# Patient Record
Sex: MALE | Race: BLACK OR AFRICAN AMERICAN | NOT HISPANIC OR LATINO | URBAN - METROPOLITAN AREA
[De-identification: names, ages, dates, MRNs, and addresses within clinical notes are randomized per-mention and may not be internally consistent; named-entity substitution may affect disease eponyms.]

---

## 2020-07-24 ENCOUNTER — INPATIENT (INPATIENT)
Facility: HOSPITAL | Age: 75
LOS: 10 days | Discharge: HOME | End: 2020-08-04
Attending: INTERNAL MEDICINE | Admitting: INTERNAL MEDICINE
Payer: MEDICARE

## 2020-07-24 VITALS
WEIGHT: 259.93 LBS | HEART RATE: 96 BPM | DIASTOLIC BLOOD PRESSURE: 48 MMHG | TEMPERATURE: 98 F | SYSTOLIC BLOOD PRESSURE: 105 MMHG | RESPIRATION RATE: 22 BRPM | HEIGHT: 69 IN | OXYGEN SATURATION: 95 %

## 2020-07-24 PROCEDURE — 93010 ELECTROCARDIOGRAM REPORT: CPT

## 2020-07-24 PROCEDURE — 99285 EMERGENCY DEPT VISIT HI MDM: CPT

## 2020-07-24 NOTE — ED ADULT TRIAGE NOTE - CHIEF COMPLAINT QUOTE
BIBA- AMS, family states worse than normal. EMS found pt diaphoretic and Rapid Afib 160s. Cardizem 25mg given  HX dementia, Creole speaking  EMS , Left AC#18G BIBA- AMS, family states worse than normal. EMS found pt diaphoretic and Rapid Afib 160s. Cardizem 25mg given  HX dementia, Creole speaking, COVID in April  EMS , Left AC#18G

## 2020-07-25 DIAGNOSIS — R41.82 ALTERED MENTAL STATUS, UNSPECIFIED: ICD-10-CM

## 2020-07-25 DIAGNOSIS — I10 ESSENTIAL (PRIMARY) HYPERTENSION: ICD-10-CM

## 2020-07-25 DIAGNOSIS — I50.22 CHRONIC SYSTOLIC (CONGESTIVE) HEART FAILURE: ICD-10-CM

## 2020-07-25 DIAGNOSIS — E11.9 TYPE 2 DIABETES MELLITUS WITHOUT COMPLICATIONS: ICD-10-CM

## 2020-07-25 DIAGNOSIS — I63.9 CEREBRAL INFARCTION, UNSPECIFIED: ICD-10-CM

## 2020-07-25 DIAGNOSIS — I26.99 OTHER PULMONARY EMBOLISM WITHOUT ACUTE COR PULMONALE: ICD-10-CM

## 2020-07-25 DIAGNOSIS — F03.90 UNSPECIFIED DEMENTIA WITHOUT BEHAVIORAL DISTURBANCE: ICD-10-CM

## 2020-07-25 DIAGNOSIS — I48.91 UNSPECIFIED ATRIAL FIBRILLATION: ICD-10-CM

## 2020-07-25 DIAGNOSIS — G93.41 METABOLIC ENCEPHALOPATHY: ICD-10-CM

## 2020-07-25 LAB
ALBUMIN SERPL ELPH-MCNC: 2.4 G/DL — LOW (ref 3.3–5)
ALP SERPL-CCNC: 49 U/L — SIGNIFICANT CHANGE UP (ref 40–120)
ALT FLD-CCNC: 33 U/L — SIGNIFICANT CHANGE UP (ref 12–78)
ANION GAP SERPL CALC-SCNC: 6 MMOL/L — SIGNIFICANT CHANGE UP (ref 5–17)
APPEARANCE UR: CLEAR — SIGNIFICANT CHANGE UP
APTT BLD: 24.4 SEC — LOW (ref 27.5–35.5)
APTT BLD: 50.4 SEC — HIGH (ref 27.5–35.5)
APTT BLD: 73.4 SEC — HIGH (ref 27.5–35.5)
AST SERPL-CCNC: 36 U/L — SIGNIFICANT CHANGE UP (ref 15–37)
BACTERIA # UR AUTO: NEGATIVE — SIGNIFICANT CHANGE UP
BILIRUB SERPL-MCNC: 0.4 MG/DL — SIGNIFICANT CHANGE UP (ref 0.2–1.2)
BILIRUB UR-MCNC: NEGATIVE — SIGNIFICANT CHANGE UP
BUN SERPL-MCNC: 15 MG/DL — SIGNIFICANT CHANGE UP (ref 7–23)
CALCIUM SERPL-MCNC: 8.4 MG/DL — LOW (ref 8.5–10.1)
CHLORIDE SERPL-SCNC: 110 MMOL/L — HIGH (ref 96–108)
CO2 SERPL-SCNC: 26 MMOL/L — SIGNIFICANT CHANGE UP (ref 22–31)
COLOR SPEC: YELLOW — SIGNIFICANT CHANGE UP
CREAT SERPL-MCNC: 1.15 MG/DL — SIGNIFICANT CHANGE UP (ref 0.5–1.3)
D DIMER BLD IA.RAPID-MCNC: 955 NG/ML DDU — HIGH
DIFF PNL FLD: ABNORMAL
EPI CELLS # UR: SIGNIFICANT CHANGE UP
GLUCOSE BLDC GLUCOMTR-MCNC: 107 MG/DL — HIGH (ref 70–99)
GLUCOSE BLDC GLUCOMTR-MCNC: 129 MG/DL — HIGH (ref 70–99)
GLUCOSE BLDC GLUCOMTR-MCNC: 186 MG/DL — HIGH (ref 70–99)
GLUCOSE BLDC GLUCOMTR-MCNC: 203 MG/DL — HIGH (ref 70–99)
GLUCOSE BLDC GLUCOMTR-MCNC: 74 MG/DL — SIGNIFICANT CHANGE UP (ref 70–99)
GLUCOSE SERPL-MCNC: 215 MG/DL — HIGH (ref 70–99)
GLUCOSE UR QL: NEGATIVE MG/DL — SIGNIFICANT CHANGE UP
HCT VFR BLD CALC: 42.4 % — SIGNIFICANT CHANGE UP (ref 39–50)
HCT VFR BLD CALC: 43.3 % — SIGNIFICANT CHANGE UP (ref 39–50)
HGB BLD-MCNC: 13.4 G/DL — SIGNIFICANT CHANGE UP (ref 13–17)
HGB BLD-MCNC: 13.7 G/DL — SIGNIFICANT CHANGE UP (ref 13–17)
INR BLD: 1.3 RATIO — HIGH (ref 0.88–1.16)
KETONES UR-MCNC: NEGATIVE — SIGNIFICANT CHANGE UP
LACTATE SERPL-SCNC: 1.5 MMOL/L — SIGNIFICANT CHANGE UP (ref 0.7–2)
LEUKOCYTE ESTERASE UR-ACNC: NEGATIVE — SIGNIFICANT CHANGE UP
LIDOCAIN IGE QN: 98 U/L — SIGNIFICANT CHANGE UP (ref 73–393)
MCHC RBC-ENTMCNC: 27.8 PG — SIGNIFICANT CHANGE UP (ref 27–34)
MCHC RBC-ENTMCNC: 28 PG — SIGNIFICANT CHANGE UP (ref 27–34)
MCHC RBC-ENTMCNC: 31.6 GM/DL — LOW (ref 32–36)
MCHC RBC-ENTMCNC: 31.6 GM/DL — LOW (ref 32–36)
MCV RBC AUTO: 88 FL — SIGNIFICANT CHANGE UP (ref 80–100)
MCV RBC AUTO: 88.5 FL — SIGNIFICANT CHANGE UP (ref 80–100)
NITRITE UR-MCNC: NEGATIVE — SIGNIFICANT CHANGE UP
NRBC # BLD: 0 /100 WBCS — SIGNIFICANT CHANGE UP (ref 0–0)
NRBC # BLD: 0 /100 WBCS — SIGNIFICANT CHANGE UP (ref 0–0)
NT-PROBNP SERPL-SCNC: 969 PG/ML — HIGH (ref 0–125)
PH UR: 5 — SIGNIFICANT CHANGE UP (ref 5–8)
PLATELET # BLD AUTO: 171 K/UL — SIGNIFICANT CHANGE UP (ref 150–400)
PLATELET # BLD AUTO: 181 K/UL — SIGNIFICANT CHANGE UP (ref 150–400)
POTASSIUM SERPL-MCNC: 4 MMOL/L — SIGNIFICANT CHANGE UP (ref 3.5–5.3)
POTASSIUM SERPL-SCNC: 4 MMOL/L — SIGNIFICANT CHANGE UP (ref 3.5–5.3)
PROT SERPL-MCNC: 6 GM/DL — SIGNIFICANT CHANGE UP (ref 6–8.3)
PROT UR-MCNC: 15 MG/DL
PROTHROM AB SERPL-ACNC: 14.3 SEC — HIGH (ref 10.6–13.6)
RBC # BLD: 4.82 M/UL — SIGNIFICANT CHANGE UP (ref 4.2–5.8)
RBC # BLD: 4.89 M/UL — SIGNIFICANT CHANGE UP (ref 4.2–5.8)
RBC # FLD: 15.8 % — HIGH (ref 10.3–14.5)
RBC # FLD: 16 % — HIGH (ref 10.3–14.5)
RBC CASTS # UR COMP ASSIST: ABNORMAL /HPF (ref 0–4)
SARS-COV-2 RNA SPEC QL NAA+PROBE: SIGNIFICANT CHANGE UP
SODIUM SERPL-SCNC: 142 MMOL/L — SIGNIFICANT CHANGE UP (ref 135–145)
SP GR SPEC: 1.01 — SIGNIFICANT CHANGE UP (ref 1.01–1.02)
TROPONIN I SERPL-MCNC: 0.03 NG/ML — SIGNIFICANT CHANGE UP (ref 0.01–0.04)
TSH SERPL-MCNC: 0.69 UIU/ML — SIGNIFICANT CHANGE UP (ref 0.36–3.74)
UROBILINOGEN FLD QL: NEGATIVE MG/DL — SIGNIFICANT CHANGE UP
WBC # BLD: 4.62 K/UL — SIGNIFICANT CHANGE UP (ref 3.8–10.5)
WBC # BLD: 4.78 K/UL — SIGNIFICANT CHANGE UP (ref 3.8–10.5)
WBC # FLD AUTO: 4.62 K/UL — SIGNIFICANT CHANGE UP (ref 3.8–10.5)
WBC # FLD AUTO: 4.78 K/UL — SIGNIFICANT CHANGE UP (ref 3.8–10.5)
WBC UR QL: SIGNIFICANT CHANGE UP

## 2020-07-25 PROCEDURE — 71045 X-RAY EXAM CHEST 1 VIEW: CPT | Mod: 26

## 2020-07-25 PROCEDURE — 71275 CT ANGIOGRAPHY CHEST: CPT | Mod: 26

## 2020-07-25 PROCEDURE — 70450 CT HEAD/BRAIN W/O DYE: CPT | Mod: 26

## 2020-07-25 PROCEDURE — 99222 1ST HOSP IP/OBS MODERATE 55: CPT

## 2020-07-25 PROCEDURE — 74177 CT ABD & PELVIS W/CONTRAST: CPT | Mod: 26

## 2020-07-25 RX ORDER — HEPARIN SODIUM 5000 [USP'U]/ML
2300 INJECTION INTRAVENOUS; SUBCUTANEOUS
Qty: 25000 | Refills: 0 | Status: DISCONTINUED | OUTPATIENT
Start: 2020-07-25 | End: 2020-07-28

## 2020-07-25 RX ORDER — ENOXAPARIN SODIUM 100 MG/ML
120 INJECTION SUBCUTANEOUS ONCE
Refills: 0 | Status: DISCONTINUED | OUTPATIENT
Start: 2020-07-25 | End: 2020-07-25

## 2020-07-25 RX ORDER — DEXTROSE 50 % IN WATER 50 %
25 SYRINGE (ML) INTRAVENOUS ONCE
Refills: 0 | Status: DISCONTINUED | OUTPATIENT
Start: 2020-07-25 | End: 2020-08-04

## 2020-07-25 RX ORDER — ASPIRIN/CALCIUM CARB/MAGNESIUM 324 MG
162 TABLET ORAL DAILY
Refills: 0 | Status: DISCONTINUED | OUTPATIENT
Start: 2020-07-25 | End: 2020-08-04

## 2020-07-25 RX ORDER — SPIRONOLACTONE 25 MG/1
25 TABLET, FILM COATED ORAL DAILY
Refills: 0 | Status: DISCONTINUED | OUTPATIENT
Start: 2020-07-25 | End: 2020-08-04

## 2020-07-25 RX ORDER — MEMANTINE HYDROCHLORIDE 10 MG/1
5 TABLET ORAL DAILY
Refills: 0 | Status: DISCONTINUED | OUTPATIENT
Start: 2020-07-25 | End: 2020-08-04

## 2020-07-25 RX ORDER — HEPARIN SODIUM 5000 [USP'U]/ML
9500 INJECTION INTRAVENOUS; SUBCUTANEOUS EVERY 6 HOURS
Refills: 0 | Status: DISCONTINUED | OUTPATIENT
Start: 2020-07-25 | End: 2020-07-25

## 2020-07-25 RX ORDER — HEPARIN SODIUM 5000 [USP'U]/ML
4500 INJECTION INTRAVENOUS; SUBCUTANEOUS EVERY 6 HOURS
Refills: 0 | Status: DISCONTINUED | OUTPATIENT
Start: 2020-07-25 | End: 2020-07-25

## 2020-07-25 RX ORDER — QUETIAPINE FUMARATE 200 MG/1
25 TABLET, FILM COATED ORAL AT BEDTIME
Refills: 0 | Status: DISCONTINUED | OUTPATIENT
Start: 2020-07-25 | End: 2020-07-27

## 2020-07-25 RX ORDER — HEPARIN SODIUM 5000 [USP'U]/ML
4500 INJECTION INTRAVENOUS; SUBCUTANEOUS EVERY 6 HOURS
Refills: 0 | Status: DISCONTINUED | OUTPATIENT
Start: 2020-07-25 | End: 2020-07-28

## 2020-07-25 RX ORDER — HEPARIN SODIUM 5000 [USP'U]/ML
9000 INJECTION INTRAVENOUS; SUBCUTANEOUS EVERY 6 HOURS
Refills: 0 | Status: DISCONTINUED | OUTPATIENT
Start: 2020-07-25 | End: 2020-07-28

## 2020-07-25 RX ORDER — DEXTROSE 50 % IN WATER 50 %
15 SYRINGE (ML) INTRAVENOUS ONCE
Refills: 0 | Status: DISCONTINUED | OUTPATIENT
Start: 2020-07-25 | End: 2020-08-04

## 2020-07-25 RX ORDER — SODIUM CHLORIDE 9 MG/ML
1000 INJECTION, SOLUTION INTRAVENOUS
Refills: 0 | Status: DISCONTINUED | OUTPATIENT
Start: 2020-07-25 | End: 2020-08-04

## 2020-07-25 RX ORDER — INSULIN LISPRO 100/ML
VIAL (ML) SUBCUTANEOUS
Refills: 0 | Status: DISCONTINUED | OUTPATIENT
Start: 2020-07-25 | End: 2020-08-04

## 2020-07-25 RX ORDER — HEPARIN SODIUM 5000 [USP'U]/ML
INJECTION INTRAVENOUS; SUBCUTANEOUS
Qty: 25000 | Refills: 0 | Status: DISCONTINUED | OUTPATIENT
Start: 2020-07-25 | End: 2020-07-25

## 2020-07-25 RX ORDER — ISOSORBIDE MONONITRATE 60 MG/1
30 TABLET, EXTENDED RELEASE ORAL DAILY
Refills: 0 | Status: DISCONTINUED | OUTPATIENT
Start: 2020-07-25 | End: 2020-07-27

## 2020-07-25 RX ORDER — GLUCAGON INJECTION, SOLUTION 0.5 MG/.1ML
1 INJECTION, SOLUTION SUBCUTANEOUS ONCE
Refills: 0 | Status: DISCONTINUED | OUTPATIENT
Start: 2020-07-25 | End: 2020-08-04

## 2020-07-25 RX ORDER — CARVEDILOL PHOSPHATE 80 MG/1
6.25 CAPSULE, EXTENDED RELEASE ORAL EVERY 12 HOURS
Refills: 0 | Status: DISCONTINUED | OUTPATIENT
Start: 2020-07-25 | End: 2020-07-26

## 2020-07-25 RX ORDER — DEXTROSE 50 % IN WATER 50 %
12.5 SYRINGE (ML) INTRAVENOUS ONCE
Refills: 0 | Status: DISCONTINUED | OUTPATIENT
Start: 2020-07-25 | End: 2020-08-04

## 2020-07-25 RX ADMIN — HEPARIN SODIUM 2100 UNIT(S)/HR: 5000 INJECTION INTRAVENOUS; SUBCUTANEOUS at 10:47

## 2020-07-25 RX ADMIN — MEMANTINE HYDROCHLORIDE 5 MILLIGRAM(S): 10 TABLET ORAL at 11:51

## 2020-07-25 RX ADMIN — HEPARIN SODIUM 2300 UNIT(S)/HR: 5000 INJECTION INTRAVENOUS; SUBCUTANEOUS at 18:52

## 2020-07-25 RX ADMIN — Medication 162 MILLIGRAM(S): at 03:12

## 2020-07-25 RX ADMIN — CARVEDILOL PHOSPHATE 6.25 MILLIGRAM(S): 80 CAPSULE, EXTENDED RELEASE ORAL at 17:42

## 2020-07-25 RX ADMIN — ISOSORBIDE MONONITRATE 30 MILLIGRAM(S): 60 TABLET, EXTENDED RELEASE ORAL at 11:51

## 2020-07-25 RX ADMIN — HEPARIN SODIUM 2100 UNIT(S)/HR: 5000 INJECTION INTRAVENOUS; SUBCUTANEOUS at 03:34

## 2020-07-25 RX ADMIN — Medication 2: at 16:51

## 2020-07-25 RX ADMIN — QUETIAPINE FUMARATE 25 MILLIGRAM(S): 200 TABLET, FILM COATED ORAL at 21:24

## 2020-07-25 NOTE — ED PROVIDER NOTE - OBJECTIVE STATEMENT
76 y/o M with pmx 76 y/o M with pmhx pacemaker, does not know his other medical history (medication list includes seroquel, lantus, plavix, isosorbide, carvedilol, namenda, spironolactone) wit AMS x 1 day. per EMS and family patient was walking around naked today and confused. they state patient always has trouble breathing. had covid in april.

## 2020-07-25 NOTE — H&P ADULT - NSHPLABSRESULTS_GEN_ALL_CORE
Recent Vitals  T(C): 37.3 (07-25-20 @ 01:23), Max: 37.3 (07-25-20 @ 01:23)  HR: 90 (07-25-20 @ 03:35) (85 - 96)  BP: 115/58 (07-25-20 @ 03:35) (105/48 - 117/52)  RR: 18 (07-25-20 @ 03:35) (17 - 22)  SpO2: 95% (07-25-20 @ 03:35) (94% - 95%)                        13.7   4.78  )-----------( 171      ( 25 Jul 2020 00:10 )             43.3     07-25    142  |  110<H>  |  15  ----------------------------<  215<H>  4.0   |  26  |  1.15    Ca    8.4<L>      25 Jul 2020 00:10    TPro  6.0  /  Alb  2.4<L>  /  TBili  0.4  /  DBili  x   /  AST  36  /  ALT  33  /  AlkPhos  49  07-25    PT/INR - ( 25 Jul 2020 00:10 )   PT: 14.3 sec;   INR: 1.30 ratio         PTT - ( 25 Jul 2020 00:10 )  PTT:24.4 sec  LIVER FUNCTIONS - ( 25 Jul 2020 00:10 )  Alb: 2.4 g/dL / Pro: 6.0 gm/dL / ALK PHOS: 49 U/L / ALT: 33 U/L / AST: 36 U/L / GGT: x               Home Medications:

## 2020-07-25 NOTE — H&P ADULT - HISTORY OF PRESENT ILLNESS
Patient is a 75F with a suspected PMH of CHF s/p PPM, HTN, DM, dementia who was brought to the ED per AMS.  Patient primarily speaks [place] Creole.  Patient currently AAOx1, unable to provide history.  Per ED attending, patient was noted to not be at his baseline.  Reportedly patient was agitated, confused, walking around his house naked.  Reportedly had COVID19 in April.  When EMS arrived, he was found to be in Afib with .  Given diltiazem in the field.  Patient's apartment reportedly extremely hot and patient found extremely diaphoretic.  Vitals stable in ED.  Labs benign.  Preliminary CT findings - L sided pulmonary embolism and thrombus within the L atrium.  Will admit to tele.

## 2020-07-25 NOTE — PROGRESS NOTE ADULT - PROBLEM SELECTOR PLAN 1
my colleague wanted rule out infection   f/u blood cultures,    UA relatively unremarkable   Has hx of dementia, reportedly current mental status worse than baseline

## 2020-07-25 NOTE — PROGRESS NOTE ADULT - SUBJECTIVE AND OBJECTIVE BOX
Patient is a 75y old  Male who presents with a chief complaint of AMS, Afib (2020 03:36)      OVERNIGHT EVENTS:      REVIEW OF SYSTEMS: denies chest pain/SOB, diaphoresis, no F/C, cough, dizziness, headache, blurry vision, nausea, vomiting, abdominal pain. Rest unremarkable     MEDICATIONS  (STANDING):  aspirin  chewable 162 milliGRAM(s) Oral daily  carvedilol 6.25 milliGRAM(s) Oral every 12 hours  dextrose 5%. 1000 milliLiter(s) (50 mL/Hr) IV Continuous <Continuous>  dextrose 50% Injectable 12.5 Gram(s) IV Push once  dextrose 50% Injectable 25 Gram(s) IV Push once  dextrose 50% Injectable 25 Gram(s) IV Push once  heparin  Infusion.  Unit(s)/Hr (21 mL/Hr) IV Continuous <Continuous>  insulin lispro (HumaLOG) corrective regimen sliding scale   SubCutaneous three times a day before meals  isosorbide   mononitrate ER Tablet (IMDUR) 30 milliGRAM(s) Oral daily  memantine 5 milliGRAM(s) Oral daily  QUEtiapine 25 milliGRAM(s) Oral at bedtime  spironolactone 25 milliGRAM(s) Oral daily    MEDICATIONS  (PRN):  dextrose 40% Gel 15 Gram(s) Oral once PRN Blood Glucose LESS THAN 70 milliGRAM(s)/deciliter  glucagon  Injectable 1 milliGRAM(s) IntraMuscular once PRN Glucose LESS THAN 70 milligrams/deciliter  heparin   Injectable 9500 Unit(s) IV Push every 6 hours PRN For aPTT less than 40  heparin   Injectable 4500 Unit(s) IV Push every 6 hours PRN For aPTT between 40 - 57      Allergies    No Known Allergies    Intolerances        SUBJECTIVE: in bed in NAD, no acute events overnight     T(F): 98.8 (20 @ 06:06), Max: 99.1 (20 @ 01:23)  HR: 93 (20 @ 06:06) (85 - 96)  BP: 131/70 (20 @ 06:06) (105/48 - 131/70)  RR: 20 (20 @ 06:06) (17 - 22)  SpO2: 95% (20 @ 06:06) (94% - 96%)  Wt(kg): --    PHYSICAL EXAM:  GENERAL: NAD, well-groomed, well-developed  HEAD:  Atraumatic, Normocephalic  EYES: EOMI, PERRLA, conjunctiva and sclera clear  ENMT: No tonsillar erythema, exudates, or enlargement; Moist mucous membranes, Good dentition, No lesions  NECK: Supple, No JVD, Normal thyroid  CHEST/LUNG: Clear to  auscultation bilaterally; No rales, rhonchi, wheezing, or rubs  bilaterally  HEART: iregular rate and rhythm; No murmurs, rubs, or gallops  ABDOMEN: Soft, Nontender, Nondistended; Bowel sounds present  EXTREMITIES:  2+ Peripheral Pulses, No clubbing, cyanosis, or edema BL LE  SKIN: No rashes or lesions  NERVOUS SYSTEM:  Alert, not oriented   DTRs 2+ intact and symmetric, sensation intact BL    LABS:                        13.7   4.78  )-----------( 171      ( 2020 00:10 )             43.3     07-25    142  |  110<H>  |  15  ----------------------------<  215<H>  4.0   |  26  |  1.15    Ca    8.4<L>      2020 00:10    TPro  6.0  /  Alb  2.4<L>  /  TBili  0.4  /  DBili  x   /  AST  36  /  ALT  33  /  AlkPhos  49  07-25    PT/INR - ( 2020 00:10 )   PT: 14.3 sec;   INR: 1.30 ratio         PTT - ( 2020 00:10 )  PTT:24.4 sec  Urinalysis Basic - ( 2020 04:10 )    Color: Yellow / Appearance: Clear / S.010 / pH: x  Gluc: x / Ketone: Negative  / Bili: Negative / Urobili: Negative mg/dL   Blood: x / Protein: 15 mg/dL / Nitrite: Negative   Leuk Esterase: Negative / RBC: 3-5 /HPF / WBC 0-2   Sq Epi: x / Non Sq Epi: Occasional / Bacteria: Negative      Cultures;   CAPILLARY BLOOD GLUCOSE    POCT Blood Glucose.: 107 mg/dL (2020 08:00)  POCT Blood Glucose.: 129 mg/dL (2020 05:58)    Lipid panel:     CARDIAC MARKERS ( 2020 00:10 )  .031 ng/mL / x     / x     / x     / x            RADIOLOGY & ADDITIONAL TESTS:  < from: Xray Chest 1 View- PORTABLE-Routine (20 @ 06:44) >  IMPRESSION: Mild increased pulmonary vascular congestion noted. No focal consolidation is seen. Status post valve replacement.        < from: CT Abdomen and Pelvis w/ IV Cont (20 @ 04:58) >  IMPRESSION:    1.  No acute CT abnormality in the abdomen and pelvis. Please refer to the separately dictated CT chest report for critical findings.    < end of copied text >    < from: CT Head No Cont (20 @ 04:58) >    No evidence of acute intracranial hemorrhage or midline shift.    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 There is no other CT evidence of acute territorial infarct. If the patient's symptoms persist, consider short interval follow-up head CT or brain MRI if there are no MRI contraindications.            < end of copied text >      Imaging Personally Reviewed:  [ x] YES      Consultant(s) Notes Reviewed:  [x ] YES     Care Discussed with [x ] Consultants [X ] Patient [x ] Family  [x ]    [x ]  Other; RN Patient is a 75y old  Male who presents with a chief complaint of AMS, Afib (2020 03:36)      OVERNIGHT EVENTS:      REVIEW OF SYSTEMS: denies chest pain/SOB, diaphoresis, no F/C, cough, dizziness, headache, blurry vision, nausea, vomiting, abdominal pain. Rest unremarkable     MEDICATIONS  (STANDING):  aspirin  chewable 162 milliGRAM(s) Oral daily  carvedilol 6.25 milliGRAM(s) Oral every 12 hours  dextrose 5%. 1000 milliLiter(s) (50 mL/Hr) IV Continuous <Continuous>  dextrose 50% Injectable 12.5 Gram(s) IV Push once  dextrose 50% Injectable 25 Gram(s) IV Push once  dextrose 50% Injectable 25 Gram(s) IV Push once  heparin  Infusion.  Unit(s)/Hr (21 mL/Hr) IV Continuous <Continuous>  insulin lispro (HumaLOG) corrective regimen sliding scale   SubCutaneous three times a day before meals  isosorbide   mononitrate ER Tablet (IMDUR) 30 milliGRAM(s) Oral daily  memantine 5 milliGRAM(s) Oral daily  QUEtiapine 25 milliGRAM(s) Oral at bedtime  spironolactone 25 milliGRAM(s) Oral daily    MEDICATIONS  (PRN):  dextrose 40% Gel 15 Gram(s) Oral once PRN Blood Glucose LESS THAN 70 milliGRAM(s)/deciliter  glucagon  Injectable 1 milliGRAM(s) IntraMuscular once PRN Glucose LESS THAN 70 milligrams/deciliter  heparin   Injectable 9500 Unit(s) IV Push every 6 hours PRN For aPTT less than 40  heparin   Injectable 4500 Unit(s) IV Push every 6 hours PRN For aPTT between 40 - 57      Allergies    No Known Allergies    Intolerances        SUBJECTIVE: in bed in NAD, no acute events overnight     T(F): 98.8 (20 @ 06:06), Max: 99.1 (20 @ 01:23)  HR: 93 (20 @ 06:06) (85 - 96)  BP: 131/70 (20 @ 06:06) (105/48 - 131/70)  RR: 20 (20 @ 06:06) (17 - 22)  SpO2: 95% (20 @ 06:06) (94% - 96%)  Wt(kg): --    PHYSICAL EXAM:  GENERAL: NAD, well-groomed, well-developed  HEAD:  Atraumatic, Normocephalic  EYES: EOMI, PERRLA, conjunctiva and sclera clear  ENMT: No tonsillar erythema, exudates, or enlargement; Moist mucous membranes, Good dentition, No lesions  NECK: Supple, No JVD, Normal thyroid  CHEST/LUNG: Clear to  auscultation bilaterally; No rales, rhonchi, wheezing, or rubs  bilaterally  HEART: iregular rate and rhythm; No murmurs, rubs, or gallops  ABDOMEN: Soft, Nontender, Nondistended; Bowel sounds present  EXTREMITIES:  2+ Peripheral Pulses, No clubbing, cyanosis, or edema BL LE  SKIN: No rashes or lesions  NERVOUS SYSTEM:  Alert, and oriented to person and place not time    DTRs 2+ intact and symmetric, sensation intact BL    LABS:                        13.7   4.78  )-----------( 171      ( 2020 00:10 )             43.3     07-25    142  |  110<H>  |  15  ----------------------------<  215<H>  4.0   |  26  |  1.15    Ca    8.4<L>      2020 00:10    TPro  6.0  /  Alb  2.4<L>  /  TBili  0.4  /  DBili  x   /  AST  36  /  ALT  33  /  AlkPhos  49  07-25    PT/INR - ( 2020 00:10 )   PT: 14.3 sec;   INR: 1.30 ratio         PTT - ( 2020 00:10 )  PTT:24.4 sec  Urinalysis Basic - ( 2020 04:10 )    Color: Yellow / Appearance: Clear / S.010 / pH: x  Gluc: x / Ketone: Negative  / Bili: Negative / Urobili: Negative mg/dL   Blood: x / Protein: 15 mg/dL / Nitrite: Negative   Leuk Esterase: Negative / RBC: 3-5 /HPF / WBC 0-2   Sq Epi: x / Non Sq Epi: Occasional / Bacteria: Negative      Cultures;   CAPILLARY BLOOD GLUCOSE    POCT Blood Glucose.: 107 mg/dL (2020 08:00)  POCT Blood Glucose.: 129 mg/dL (2020 05:58)    Lipid panel:     CARDIAC MARKERS ( 2020 00:10 )  .031 ng/mL / x     / x     / x     / x            RADIOLOGY & ADDITIONAL TESTS:  < from: Xray Chest 1 View- PORTABLE-Routine (20 @ 06:44) >  IMPRESSION: Mild increased pulmonary vascular congestion noted. No focal consolidation is seen. Status post valve replacement.        < from: CT Abdomen and Pelvis w/ IV Cont (20 @ 04:58) >  IMPRESSION:    1.  No acute CT abnormality in the abdomen and pelvis. Please refer to the separately dictated CT chest report for critical findings.    < end of copied text >    < from: CT Head No Cont (20 @ 04:58) >    No evidence of acute intracranial hemorrhage or midline shift.    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 There is no other CT evidence of acute territorial infarct. If the patient's symptoms persist, consider short interval follow-up head CT or brain MRI if there are no MRI contraindications.            < end of copied text >      Imaging Personally Reviewed:  [ x] YES      Consultant(s) Notes Reviewed:  [x ] YES     Care Discussed with [x ] Consultants [X ] Patient [x ] Family  [x ]    [x ]  Other; RN

## 2020-07-25 NOTE — PROGRESS NOTE ADULT - PROBLEM SELECTOR PLAN 3
was Started on heparin drip by my colleague    f/u echo    also noted JUAN thrombus on CT chest  will get Cardiology consult may need AGUSTÍN if TTE unrevealing will defer to cardiology AC as per above, currently rate controlled.  Continue carvedilol  Noted thrombus in LA - delpirore consulted    f/u echo

## 2020-07-25 NOTE — CONSULT NOTE ADULT - SUBJECTIVE AND OBJECTIVE BOX
HPI:  HPI:  Patient is a 75F with a suspected PMH of CHF s/p PPM, HTN, DM, dementia who was brought to the ED per AMS.  Patient primarily speaks [place] Creole.  Patient currently AAOx1, unable to provide history.  Per ED attending, patient was noted to not be at his baseline.  Reportedly patient was agitated, confused, walking around his house naked.  Reportedly had COVID19 in April.  When EMS arrived, he was found to be in Afib with .  Given diltiazem in the field.  Patient's apartment reportedly extremely hot and patient found extremely diaphoretic.  Vitals stable in ED.  Labs benign.  Preliminary CT findings - L sided pulmonary embolism and thrombus within the L atrium.  Will admit to tele. (2020 03:36)      Chief Complaint:  Patient is a 75y old  Male who presents with a chief complaint of AMS, Afib (2020 09:51)      Review of Systems:    General:  No wt loss, fevers, chills, night sweats  Eyes:  Good vision, no reported pain  ENT:  No sore throat, pain, runny nose, dysphagia  CV:  No pain, palpitations, hypo/hypertension  Resp:  No dyspnea, cough, tachypnea, wheezing  GI:  No pain, nausea, vomiting, diarrhea, constipation  :  No pain, bleeding, incontinence, nocturia           Social History/Family History  SOCHX:   tobacco,  -  alcohol    FMHX: FA/MO  - contributory       Discussed with:  PMD, Family    Physical Exam:    Vital Signs:  Vital Signs Last 24 Hrs  T(C): 36.7 (2020 16:48), Max: 37.3 (2020 01:23)  T(F): 98.1 (2020 16:48), Max: 99.1 (2020 01:23)  HR: 96 (2020 16:48) (85 - 96)  BP: 115/70 (2020 16:48) (105/48 - 131/70)  BP(mean): --  RR: 18 (2020 16:48) (17 - 22)  SpO2: 99% (2020 16:48) (94% - 99%)  Daily Height in cm: 167.64 (2020 16:48)    Daily   I&O's Summary    2020 07:01  -  2020 07:00  --------------------------------------------------------  IN: 0 mL / OUT: 650 mL / NET: -650 mL  2020 07:01  -  2020 21:36  --------------------------------------------------------  IN: 720 mL / OUT: 0 mL / NET: 720 mL          Chest:  Full & symmetric excursion, no increased effort, breath sounds clear  Cardiovascular:  Regular rhythm, S1, S2, no murmur/rub/S3/S4, no carotid/femoral/abdominal bruit, radial/pedal pulses 2+,   Abdomen:  Soft, non-tender, non-distended, normoactive bowel sounds, no HSM      Laboratory:                          13.4   4.62  )-----------( 181      ( 2020 10:32 )             42.4     07-25    142  |  110<H>  |  15  ----------------------------<  215<H>  4.0   |  26  |  1.15    Ca    8.4<L>      2020 00:10    TPro  6.0  /  Alb  2.4<L>  /  TBili  0.4  /  DBili  x   /  AST  36  /  ALT  33  /  AlkPhos  49  07-25      CARDIAC MARKERS ( 2020 00:10 )  .031 ng/mL / x     / x     / x     / x          CAPILLARY BLOOD GLUCOSE      POCT Blood Glucose.: 186 mg/dL (2020 21:23)  POCT Blood Glucose.: 203 mg/dL (2020 16:45)  POCT Blood Glucose.: 74 mg/dL (2020 11:10)  POCT Blood Glucose.: 107 mg/dL (2020 08:00)  POCT Blood Glucose.: 129 mg/dL (2020 05:58)    LIVER FUNCTIONS - ( 2020 00:10 )  Alb: 2.4 g/dL / Pro: 6.0 gm/dL / ALK PHOS: 49 U/L / ALT: 33 U/L / AST: 36 U/L / GGT: x           PT/INR - ( 2020 00:10 )   PT: 14.3 sec;   INR: 1.30 ratio         PTT - ( 2020 17:39 )  PTT:50.4 sec  Urinalysis Basic - ( 2020 04:10 )    Color: Yellow / Appearance: Clear / S.010 / pH: x  Gluc: x / Ketone: Negative  / Bili: Negative / Urobili: Negative mg/dL   Blood: x / Protein: 15 mg/dL / Nitrite: Negative   Leuk Esterase: Negative / RBC: 3-5 /HPF / WBC 0-2   Sq Epi: x / Non Sq Epi: Occasional / Bacteria: Negative      Assessment:  the patient's primary diagnosis was altered mental status  Upon review of the chart due to the patient not giving an adequate history through an  they appear confused  the CAT scan angiogram reveals a pulmonary embolus and a possible extension into the atrium of a thrombus  Treatment for both of these issues remains the same with heparin intravenous continuing  A diagnostic echocardiogram is completed however the official reading is not complete as yet  Cardiac enzymes remain normal, BNP level is low  EKG is noted And vital signs remained stable on current regimen

## 2020-07-25 NOTE — ED ADULT NURSE NOTE - CHIEF COMPLAINT QUOTE
BIBA- AMS, family states worse than normal. EMS found pt diaphoretic and Rapid Afib 160s. Cardizem 25mg given  HX dementia, Creole speaking  EMS , Left AC#18G

## 2020-07-25 NOTE — PROGRESS NOTE ADULT - PROBLEM SELECTOR PROBLEM 3
Acute pulmonary embolism, unspecified pulmonary embolism type, unspecified whether acute cor pulmonale present Atrial fibrillation, unspecified type

## 2020-07-25 NOTE — H&P ADULT - NSHPPHYSICALEXAM_GEN_ALL_CORE
Physical exam:  General: morbidly obese patient in no acute distress   Head:  Atraumatic, Normocephalic  Eyes: EOMI, PERRLA, clear sclera  Neck: Supple, thyroid nontender, non enlarged  Cardio: S1/S2 +ve, irregular rhythm,   Resp: clear to ausculation bilaterally, no rales or wheezes  GI: abdomen soft, nontender, non distended, no guarding, BS +ve x 4  Ext: +1 edema bilaterally  Neuro: AAOx1, no gross motor deficits, unresponsive to commands   Skin: No rashes or lesions

## 2020-07-25 NOTE — PROGRESS NOTE ADULT - PROBLEM SELECTOR PROBLEM 2
Altered mental status, unspecified altered mental status type Acute pulmonary embolism, unspecified pulmonary embolism type, unspecified whether acute cor pulmonale present

## 2020-07-25 NOTE — H&P ADULT - PROBLEM SELECTOR PLAN 1
Will r/o infection.  Sending blood cultures, will straight cath for urine specimen  Has hx of dementia, reportedly current mental status worse than baseline

## 2020-07-25 NOTE — ED PROVIDER NOTE - CARE PLAN
Principal Discharge DX:	Pulmonary embolus  Secondary Diagnosis:	Altered mental state  Secondary Diagnosis:	Abnormal EKG

## 2020-07-25 NOTE — H&P ADULT - ASSESSMENT
Patient is a 75F with a suspected PMH of CHF s/p PPM, HTN, DM, dementia who was brought to the ED per AMS.  Patient primarily speaks [place] Creole.  Patient currently AAOx1, unable to provide history.  Per ED attending, patient was noted to not be at his baseline.  Reportedly patient was agitated, confused, walking around his house naked.  Reportedly had COVID19 in April.  When EMS arrived, he was found to be in Afib with .  Given diltiazem in the field.  Patient's apartment reportedly extremely hot and patient found extremely diaphoretic.  Vitals stable in ED.  Labs benign.  Preliminary CT findings - L sided pulmonary embolism and thrombus within the L atrium.  Will admit to tele.    IMPROVE VTE Individual Risk Assessment          RISK                                                          Points  [  ] Previous VTE                                                3  [  ] Thrombophilia                                             2  [  ] Lower limb paralysis                                    2        (unable to hold up >15 seconds)    [  ] Current Cancer                                             2         (within 6 months)  [  ] Immobilization > 24 hrs                              1  [  ] ICU/CCU stay > 24 hours                            1  [  ] Age > 60                                                    1    IMPROVE VTE Score - 1

## 2020-07-25 NOTE — H&P ADULT - PROBLEM SELECTOR PLAN 3
AC as per above, currently rate controlled.  Continue carvedilol  Noted thrombus in LA - will order TTE, cardio eval in am

## 2020-07-25 NOTE — PROGRESS NOTE ADULT - PROBLEM SELECTOR PROBLEM 6
Chronic systolic congestive heart failure Dementia without behavioral disturbance, unspecified dementia type

## 2020-07-25 NOTE — PROGRESS NOTE ADULT - PROBLEM SELECTOR PLAN 5
age indeterminant infarct in left BG   unclear given presence of Mitral valve repair and sternotomy can obtain MRI .    nevertheless check lipid panel    continue preventive measures and treatment for cva    get pt/ot spironolactone, imdur

## 2020-07-25 NOTE — ED PROVIDER NOTE - PROGRESS NOTE DETAILS
CT read significant for PE and atrial thrombus. lovenox and aspirin ordered for anticoagulation and for TWI on ekg. will admit for PE, inpatient workup including cardiology evaluation and echocardiogram, and managememnt of altere dmental state. BNP elevated, will not siruese as patient not clinically fluid overloaded, and possible that BNP elevation was secondary to ipaired ventricular filling due to RVR.

## 2020-07-26 LAB
A1C WITH ESTIMATED AVERAGE GLUCOSE RESULT: 12.3 % — HIGH (ref 4–5.6)
ALBUMIN SERPL ELPH-MCNC: 2.2 G/DL — LOW (ref 3.3–5)
ALP SERPL-CCNC: 43 U/L — SIGNIFICANT CHANGE UP (ref 40–120)
ALT FLD-CCNC: 33 U/L — SIGNIFICANT CHANGE UP (ref 12–78)
ANION GAP SERPL CALC-SCNC: 6 MMOL/L — SIGNIFICANT CHANGE UP (ref 5–17)
APTT BLD: 146 SEC — CRITICAL HIGH (ref 27.5–35.5)
APTT BLD: 161.4 SEC — CRITICAL HIGH (ref 27.5–35.5)
APTT BLD: 93.7 SEC — HIGH (ref 27.5–35.5)
AST SERPL-CCNC: 37 U/L — SIGNIFICANT CHANGE UP (ref 15–37)
BILIRUB SERPL-MCNC: 0.4 MG/DL — SIGNIFICANT CHANGE UP (ref 0.2–1.2)
BUN SERPL-MCNC: 11 MG/DL — SIGNIFICANT CHANGE UP (ref 7–23)
CALCIUM SERPL-MCNC: 8.2 MG/DL — LOW (ref 8.5–10.1)
CHLORIDE SERPL-SCNC: 107 MMOL/L — SIGNIFICANT CHANGE UP (ref 96–108)
CHOLEST SERPL-MCNC: 212 MG/DL — HIGH (ref 10–199)
CO2 SERPL-SCNC: 26 MMOL/L — SIGNIFICANT CHANGE UP (ref 22–31)
CREAT SERPL-MCNC: 0.87 MG/DL — SIGNIFICANT CHANGE UP (ref 0.5–1.3)
ESTIMATED AVERAGE GLUCOSE: 306 MG/DL — HIGH (ref 68–114)
GLUCOSE BLDC GLUCOMTR-MCNC: 126 MG/DL — HIGH (ref 70–99)
GLUCOSE BLDC GLUCOMTR-MCNC: 161 MG/DL — HIGH (ref 70–99)
GLUCOSE BLDC GLUCOMTR-MCNC: 176 MG/DL — HIGH (ref 70–99)
GLUCOSE BLDC GLUCOMTR-MCNC: 213 MG/DL — HIGH (ref 70–99)
GLUCOSE SERPL-MCNC: 131 MG/DL — HIGH (ref 70–99)
HCT VFR BLD CALC: 39.1 % — SIGNIFICANT CHANGE UP (ref 39–50)
HCT VFR BLD CALC: 42.3 % — SIGNIFICANT CHANGE UP (ref 39–50)
HCV AB S/CO SERPL IA: 0.12 S/CO — SIGNIFICANT CHANGE UP (ref 0–0.99)
HCV AB SERPL-IMP: SIGNIFICANT CHANGE UP
HDLC SERPL-MCNC: 30 MG/DL — LOW
HGB BLD-MCNC: 12.4 G/DL — LOW (ref 13–17)
HGB BLD-MCNC: 12.7 G/DL — LOW (ref 13–17)
LIPID PNL WITH DIRECT LDL SERPL: 158 MG/DL — HIGH
MAGNESIUM SERPL-MCNC: 2.1 MG/DL — SIGNIFICANT CHANGE UP (ref 1.6–2.6)
MCHC RBC-ENTMCNC: 27.4 PG — SIGNIFICANT CHANGE UP (ref 27–34)
MCHC RBC-ENTMCNC: 28.3 PG — SIGNIFICANT CHANGE UP (ref 27–34)
MCHC RBC-ENTMCNC: 30 GM/DL — LOW (ref 32–36)
MCHC RBC-ENTMCNC: 31.7 GM/DL — LOW (ref 32–36)
MCV RBC AUTO: 89.3 FL — SIGNIFICANT CHANGE UP (ref 80–100)
MCV RBC AUTO: 91.2 FL — SIGNIFICANT CHANGE UP (ref 80–100)
NRBC # BLD: 0 /100 WBCS — SIGNIFICANT CHANGE UP (ref 0–0)
NRBC # BLD: 0 /100 WBCS — SIGNIFICANT CHANGE UP (ref 0–0)
PHOSPHATE SERPL-MCNC: 4 MG/DL — SIGNIFICANT CHANGE UP (ref 2.5–4.5)
PLATELET # BLD AUTO: 141 K/UL — LOW (ref 150–400)
PLATELET # BLD AUTO: 178 K/UL — SIGNIFICANT CHANGE UP (ref 150–400)
POTASSIUM SERPL-MCNC: 3.6 MMOL/L — SIGNIFICANT CHANGE UP (ref 3.5–5.3)
POTASSIUM SERPL-SCNC: 3.6 MMOL/L — SIGNIFICANT CHANGE UP (ref 3.5–5.3)
PROT SERPL-MCNC: 5.7 GM/DL — LOW (ref 6–8.3)
RBC # BLD: 4.38 M/UL — SIGNIFICANT CHANGE UP (ref 4.2–5.8)
RBC # BLD: 4.64 M/UL — SIGNIFICANT CHANGE UP (ref 4.2–5.8)
RBC # FLD: 15.9 % — HIGH (ref 10.3–14.5)
RBC # FLD: 16 % — HIGH (ref 10.3–14.5)
SARS-COV-2 IGG SERPL QL IA: POSITIVE
SARS-COV-2 IGM SERPL IA-ACNC: 2.98 INDEX — HIGH
SODIUM SERPL-SCNC: 139 MMOL/L — SIGNIFICANT CHANGE UP (ref 135–145)
TOTAL CHOLESTEROL/HDL RATIO MEASUREMENT: 7.1 RATIO — SIGNIFICANT CHANGE UP (ref 3.4–9.6)
TRIGL SERPL-MCNC: 120 MG/DL — SIGNIFICANT CHANGE UP (ref 10–149)
WBC # BLD: 3.25 K/UL — LOW (ref 3.8–10.5)
WBC # BLD: 4.02 K/UL — SIGNIFICANT CHANGE UP (ref 3.8–10.5)
WBC # FLD AUTO: 3.25 K/UL — LOW (ref 3.8–10.5)
WBC # FLD AUTO: 4.02 K/UL — SIGNIFICANT CHANGE UP (ref 3.8–10.5)

## 2020-07-26 PROCEDURE — 99233 SBSQ HOSP IP/OBS HIGH 50: CPT

## 2020-07-26 PROCEDURE — 93010 ELECTROCARDIOGRAM REPORT: CPT

## 2020-07-26 RX ORDER — CARVEDILOL PHOSPHATE 80 MG/1
12.5 CAPSULE, EXTENDED RELEASE ORAL EVERY 12 HOURS
Refills: 0 | Status: DISCONTINUED | OUTPATIENT
Start: 2020-07-26 | End: 2020-08-04

## 2020-07-26 RX ORDER — ATORVASTATIN CALCIUM 80 MG/1
40 TABLET, FILM COATED ORAL AT BEDTIME
Refills: 0 | Status: DISCONTINUED | OUTPATIENT
Start: 2020-07-26 | End: 2020-08-04

## 2020-07-26 RX ADMIN — QUETIAPINE FUMARATE 25 MILLIGRAM(S): 200 TABLET, FILM COATED ORAL at 22:53

## 2020-07-26 RX ADMIN — MEMANTINE HYDROCHLORIDE 5 MILLIGRAM(S): 10 TABLET ORAL at 14:03

## 2020-07-26 RX ADMIN — ISOSORBIDE MONONITRATE 30 MILLIGRAM(S): 60 TABLET, EXTENDED RELEASE ORAL at 14:03

## 2020-07-26 RX ADMIN — CARVEDILOL PHOSPHATE 12.5 MILLIGRAM(S): 80 CAPSULE, EXTENDED RELEASE ORAL at 17:30

## 2020-07-26 RX ADMIN — HEPARIN SODIUM 1500 UNIT(S)/HR: 5000 INJECTION INTRAVENOUS; SUBCUTANEOUS at 21:58

## 2020-07-26 RX ADMIN — Medication 2: at 17:31

## 2020-07-26 RX ADMIN — HEPARIN SODIUM 0 UNIT(S)/HR: 5000 INJECTION INTRAVENOUS; SUBCUTANEOUS at 11:35

## 2020-07-26 RX ADMIN — Medication 1: at 11:34

## 2020-07-26 RX ADMIN — HEPARIN SODIUM 1500 UNIT(S)/HR: 5000 INJECTION INTRAVENOUS; SUBCUTANEOUS at 12:40

## 2020-07-26 RX ADMIN — Medication 162 MILLIGRAM(S): at 14:03

## 2020-07-26 RX ADMIN — ATORVASTATIN CALCIUM 40 MILLIGRAM(S): 80 TABLET, FILM COATED ORAL at 21:53

## 2020-07-26 RX ADMIN — HEPARIN SODIUM 1900 UNIT(S)/HR: 5000 INJECTION INTRAVENOUS; SUBCUTANEOUS at 02:54

## 2020-07-26 RX ADMIN — CARVEDILOL PHOSPHATE 6.25 MILLIGRAM(S): 80 CAPSULE, EXTENDED RELEASE ORAL at 05:23

## 2020-07-26 RX ADMIN — SPIRONOLACTONE 25 MILLIGRAM(S): 25 TABLET, FILM COATED ORAL at 05:23

## 2020-07-26 RX ADMIN — HEPARIN SODIUM 0 UNIT(S)/HR: 5000 INJECTION INTRAVENOUS; SUBCUTANEOUS at 01:50

## 2020-07-26 NOTE — PHYSICAL THERAPY INITIAL EVALUATION ADULT - LIVES WITH, PROFILE
Pt states he lives in pvt house with 3 family members, 3 steps to enter with rail, no steps inside the house.

## 2020-07-26 NOTE — PROGRESS NOTE ADULT - PROBLEM SELECTOR PLAN 4
age indeterminant infarct in left BG   unclear given presence of Mitral valve repair and sternotomy can obtain MRI .    nevertheless check lipid panel    continue preventive measures and treatment for cva    get pt/ot

## 2020-07-26 NOTE — PHYSICAL THERAPY INITIAL EVALUATION ADULT - DIAGNOSIS, PT EVAL
Pt presented with ability to perform tasks-bed mobility, transfers and ambulation with supervision and set up, functional endurance is fair +, does not need walking device.

## 2020-07-26 NOTE — PROGRESS NOTE ADULT - SUBJECTIVE AND OBJECTIVE BOX
Patient is a 75y old  Male who presents with a chief complaint of AMS, Afib (25 Jul 2020 03:36)      OVERNIGHT EVENTS: none    MEDICATIONS  (STANDING):  aspirin  chewable 162 milliGRAM(s) Oral daily  carvedilol 6.25 milliGRAM(s) Oral every 12 hours  dextrose 5%. 1000 milliLiter(s) (50 mL/Hr) IV Continuous <Continuous>  dextrose 50% Injectable 12.5 Gram(s) IV Push once  dextrose 50% Injectable 25 Gram(s) IV Push once  dextrose 50% Injectable 25 Gram(s) IV Push once  heparin  Infusion. 2300 Unit(s)/Hr (23 mL/Hr) IV Continuous <Continuous>  insulin lispro (HumaLOG) corrective regimen sliding scale   SubCutaneous three times a day before meals  isosorbide   mononitrate ER Tablet (IMDUR) 30 milliGRAM(s) Oral daily  memantine 5 milliGRAM(s) Oral daily  QUEtiapine 25 milliGRAM(s) Oral at bedtime  spironolactone 25 milliGRAM(s) Oral daily    MEDICATIONS  (PRN):  dextrose 40% Gel 15 Gram(s) Oral once PRN Blood Glucose LESS THAN 70 milliGRAM(s)/deciliter  glucagon  Injectable 1 milliGRAM(s) IntraMuscular once PRN Glucose LESS THAN 70 milligrams/deciliter  heparin   Injectable 9000 Unit(s) IV Push every 6 hours PRN For aPTT less than 40  heparin   Injectable 4500 Unit(s) IV Push every 6 hours PRN For aPTT between 40 - 57    Allergies    No Known Allergies    Intolerances        SUBJECTIVE: in bed in NAD, no acute events overnight     Vital Signs Last 24 Hrs  T(C): 36.7 (26 Jul 2020 05:00), Max: 37.1 (26 Jul 2020 00:18)  T(F): 98 (26 Jul 2020 05:00), Max: 98.8 (26 Jul 2020 00:18)  HR: 101 (26 Jul 2020 05:21) (91 - 104)  BP: 135/77 (26 Jul 2020 05:21) (115/70 - 135/77)  BP(mean): --  RR: 18 (26 Jul 2020 05:00) (18 - 19)  SpO2: 98% (26 Jul 2020 05:00) (96% - 99%)    PHYSICAL EXAM:  GENERAL: NAD, well-groomed, well-developed  HEAD:  Atraumatic, Normocephalic  EYES: EOMI, PERRLA, conjunctiva and sclera clear  ENMT: No tonsillar erythema, exudates, or enlargement; Moist mucous membranes, Good dentition, No lesions  NECK: Supple, No JVD, Normal thyroid  CHEST/LUNG: Clear to  auscultation bilaterally; No rales, rhonchi, wheezing, or rubs  bilaterally  HEART: iregular rate and rhythm; No murmurs, rubs, or gallops  ABDOMEN: Soft, Nontender, Nondistended; Bowel sounds present  EXTREMITIES:  2+ Peripheral Pulses, No clubbing, cyanosis, or edema BL LE  SKIN: No rashes or lesions  NERVOUS SYSTEM:  Alert, and oriented to person and place not time    DTRs 2+ intact and symmetric, sensation intact BL    LABS:                                          12.7   3.25  )-----------( 178      ( 26 Jul 2020 07:47 )             42.3   07-26    139  |  107  |  11  ----------------------------<  131<H>  3.6   |  26  |  0.87    Ca    8.2<L>      26 Jul 2020 07:47  Phos  4.0     07-26  Mg     2.1     07-26    TPro  5.7<L>  /  Alb  2.2<L>  /  TBili  0.4  /  DBili  x   /  AST  37  /  ALT  33  /  AlkPhos  43  07-26           Cultures;   CAPILLARY BLOOD GLUCOSE      POCT Blood Glucose.: 126 mg/dL (26 Jul 2020 07:47)  POCT Blood Glucose.: 186 mg/dL (25 Jul 2020 21:23)  POCT Blood Glucose.: 203 mg/dL (25 Jul 2020 16:45)  POCT Blood Glucose.: 74 mg/dL (25 Jul 2020 11:10)    Lipid panel:     CARDIAC MARKERS ( 25 Jul 2020 00:10 )  .031 ng/mL / x     / x     / x     / x            RADIOLOGY & ADDITIONAL TESTS:  < from: Xray Chest 1 View- PORTABLE-Routine (07.25.20 @ 06:44) >  IMPRESSION: Mild increased pulmonary vascular congestion noted. No focal consolidation is seen. Status post valve replacement.        < from: CT Abdomen and Pelvis w/ IV Cont (07.25.20 @ 04:58) >  IMPRESSION:    1.  No acute CT abnormality in the abdomen and pelvis. Please refer to the separately dictated CT chest report for critical findings.    < end of copied text >    < from: CT Head No Cont (07.25.20 @ 04:58) >    No evidence of acute intracranial hemorrhage or midline shift.    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 There is no other CT evidence of acute territorial infarct. If the patient's symptoms persist, consider short interval follow-up head CT or brain MRI if there are no MRI contraindications.            < end of copied text >      Imaging Personally Reviewed:  [ x] YES      Consultant(s) Notes Reviewed:  [x ] YES     Care Discussed with [x ] Consultants [X ] Patient [x ] Family  [x ]    [x ]  Other; RN

## 2020-07-26 NOTE — PHYSICAL THERAPY INITIAL EVALUATION ADULT - PRECAUTIONS/LIMITATIONS, REHAB EVAL
pt found with lap belt and chair alarm, CNA states pt was confused earlier and wanted to get up and walk by himself without asking for assistance.

## 2020-07-26 NOTE — PROGRESS NOTE ADULT - PROBLEM SELECTOR PLAN 3
AC as per above, currently rate controlled.  Continue carvedilol  Noted thrombus in LA - delpirore consult noted    f/u echo

## 2020-07-26 NOTE — PROGRESS NOTE ADULT - SUBJECTIVE AND OBJECTIVE BOX
75F with a suspected PMH of CHF s/p PPM, HTN, DM, dementia who was brought to the ED per AMS.  Patient primarily speaks [place] Creole.  Patient currently AAOx1, unable to provide history.  Per ED attending, patient was noted to not be at his baseline.  Reportedly patient was agitated, confused, walking around his house naked.  Reportedly had COVID19 in April.  When EMS arrived, he was found to be in Afib with .  Given diltiazem in the field.  Patient's apartment reportedly extremely hot and patient found extremely diaphoretic.  Vitals stable in ED.  Labs benign.  Preliminary CT findings - L sided pulmonary embolism and thrombus within the L atrium.  Will admit to tele. (2020 03:36)      Chief Complaint:  Patient is a 75y old  Male who presents with a chief complaint of AMS, Afib (2020 09:51)      Review of Systems:    General:  No wt loss, fevers, chills, night sweats  Eyes:  Good vision, no reported pain  ENT:  No sore throat, pain, runny nose, dysphagia  CV:  No pain, palpitations, hypo/hypertension  Resp:  No dyspnea, cough, tachypnea, wheezing  GI:  No pain, nausea, vomiting, diarrhea, constipation  :  No pain, bleeding, incontinence, nocturia           Social History/Family History  SOCHX:   tobacco,  -  alcohol    FMHX: FA/MO  - contributory       Discussed with:  PMD, Family    Physical Exam:    Vital Signs:  Vital Signs Last 24 Hrs  T(C): 36.7 (2020 16:48), Max: 37.3 (2020 01:23)  T(F): 98.1 (2020 16:48), Max: 99.1 (2020 01:23)  HR: 96 (2020 16:48) (85 - 96)  BP: 115/70 (2020 16:48) (105/48 - 131/70)  BP(mean): --  RR: 18 (2020 16:48) (17 - 22)  SpO2: 99% (2020 16:48) (94% - 99%)  Daily Height in cm: 167.64 (2020 16:48)    Daily   I&O's Summary    2020 07:01  -  2020 07:00  --------------------------------------------------------  IN: 0 mL / OUT: 650 mL / NET: -650 mL  2020 07:01  -  2020 21:36  --------------------------------------------------------  IN: 720 mL / OUT: 0 mL / NET: 720 mL          Chest:  Full & symmetric excursion, no increased effort, breath sounds clear  Cardiovascular:  Regular rhythm, S1, S2, no murmur/rub/S3/S4, no carotid/femoral/abdominal bruit, radial/pedal pulses 2+,   Abdomen:  Soft, non-tender, non-distended, normoactive bowel sounds, no HSM      Laboratory:                          13.4   4.62  )-----------( 181      ( 2020 10:32 )             42.4     07-25    142  |  110<H>  |  15  ----------------------------<  215<H>  4.0   |  26  |  1.15    Ca    8.4<L>      2020 00:10    TPro  6.0  /  Alb  2.4<L>  /  TBili  0.4  /  DBili  x   /  AST  36  /  ALT  33  /  AlkPhos  49  07-25      CARDIAC MARKERS ( 2020 00:10 )  .031 ng/mL / x     / x     / x     / x          CAPILLARY BLOOD GLUCOSE      POCT Blood Glucose.: 186 mg/dL (2020 21:23)  POCT Blood Glucose.: 203 mg/dL (2020 16:45)  POCT Blood Glucose.: 74 mg/dL (2020 11:10)  POCT Blood Glucose.: 107 mg/dL (2020 08:00)  POCT Blood Glucose.: 129 mg/dL (2020 05:58)    LIVER FUNCTIONS - ( 2020 00:10 )  Alb: 2.4 g/dL / Pro: 6.0 gm/dL / ALK PHOS: 49 U/L / ALT: 33 U/L / AST: 36 U/L / GGT: x           PT/INR - ( 2020 00:10 )   PT: 14.3 sec;   INR: 1.30 ratio         PTT - ( 2020 17:39 )  PTT:50.4 sec  Urinalysis Basic - ( 2020 04:10 )    Color: Yellow / Appearance: Clear / S.010 / pH: x  Gluc: x / Ketone: Negative  / Bili: Negative / Urobili: Negative mg/dL   Blood: x / Protein: 15 mg/dL / Nitrite: Negative   Leuk Esterase: Negative / RBC: 3-5 /HPF / WBC 0-2   Sq Epi: x / Non Sq Epi: Occasional / Bacteria: Negative      Assessment:  the patient's primary diagnosis was altered mental status  Upon review of the chart due to the patient not giving an adequate history through an  they appear confused  the CAT scan angiogram reveals a pulmonary embolus and a possible extension into the atrium of a thrombus  Treatment for both of these issues remains the same with heparin intravenous continuing  A diagnostic echocardiogram is not completed  Cardiac enzymes remain normal, BNP level is low  EKG is noted And vital signs remained stable on current regimen

## 2020-07-26 NOTE — PHYSICAL THERAPY INITIAL EVALUATION ADULT - GENERAL OBSERVATIONS, REHAB EVAL
Pt found alert, oriented x 3, follow simple directions, cooperative, not in distress, no complaints, on room air, has IV line, speaks both Creole and English.

## 2020-07-27 LAB
APTT BLD: 71.6 SEC — HIGH (ref 27.5–35.5)
GLUCOSE BLDC GLUCOMTR-MCNC: 152 MG/DL — HIGH (ref 70–99)
GLUCOSE BLDC GLUCOMTR-MCNC: 182 MG/DL — HIGH (ref 70–99)
GLUCOSE BLDC GLUCOMTR-MCNC: 202 MG/DL — HIGH (ref 70–99)
GLUCOSE BLDC GLUCOMTR-MCNC: 278 MG/DL — HIGH (ref 70–99)
HCT VFR BLD CALC: 38.8 % — LOW (ref 39–50)
HCT VFR BLD CALC: 42.2 % — SIGNIFICANT CHANGE UP (ref 39–50)
HGB BLD-MCNC: 12.1 G/DL — LOW (ref 13–17)
HGB BLD-MCNC: 12.9 G/DL — LOW (ref 13–17)
MCHC RBC-ENTMCNC: 27.7 PG — SIGNIFICANT CHANGE UP (ref 27–34)
MCHC RBC-ENTMCNC: 27.9 PG — SIGNIFICANT CHANGE UP (ref 27–34)
MCHC RBC-ENTMCNC: 30.6 GM/DL — LOW (ref 32–36)
MCHC RBC-ENTMCNC: 31.2 GM/DL — LOW (ref 32–36)
MCV RBC AUTO: 89.4 FL — SIGNIFICANT CHANGE UP (ref 80–100)
MCV RBC AUTO: 90.6 FL — SIGNIFICANT CHANGE UP (ref 80–100)
NRBC # BLD: 0 /100 WBCS — SIGNIFICANT CHANGE UP (ref 0–0)
NRBC # BLD: 0 /100 WBCS — SIGNIFICANT CHANGE UP (ref 0–0)
PLATELET # BLD AUTO: 174 K/UL — SIGNIFICANT CHANGE UP (ref 150–400)
PLATELET # BLD AUTO: 177 K/UL — SIGNIFICANT CHANGE UP (ref 150–400)
RBC # BLD: 4.34 M/UL — SIGNIFICANT CHANGE UP (ref 4.2–5.8)
RBC # BLD: 4.66 M/UL — SIGNIFICANT CHANGE UP (ref 4.2–5.8)
RBC # FLD: 15.9 % — HIGH (ref 10.3–14.5)
RBC # FLD: 16 % — HIGH (ref 10.3–14.5)
WBC # BLD: 4.4 K/UL — SIGNIFICANT CHANGE UP (ref 3.8–10.5)
WBC # BLD: 5.06 K/UL — SIGNIFICANT CHANGE UP (ref 3.8–10.5)
WBC # FLD AUTO: 4.4 K/UL — SIGNIFICANT CHANGE UP (ref 3.8–10.5)
WBC # FLD AUTO: 5.06 K/UL — SIGNIFICANT CHANGE UP (ref 3.8–10.5)

## 2020-07-27 PROCEDURE — 99233 SBSQ HOSP IP/OBS HIGH 50: CPT

## 2020-07-27 PROCEDURE — 93306 TTE W/DOPPLER COMPLETE: CPT | Mod: 26

## 2020-07-27 RX ORDER — INSULIN GLARGINE 100 [IU]/ML
30 INJECTION, SOLUTION SUBCUTANEOUS AT BEDTIME
Refills: 0 | Status: DISCONTINUED | OUTPATIENT
Start: 2020-07-27 | End: 2020-08-04

## 2020-07-27 RX ORDER — QUETIAPINE FUMARATE 200 MG/1
200 TABLET, FILM COATED ORAL AT BEDTIME
Refills: 0 | Status: DISCONTINUED | OUTPATIENT
Start: 2020-07-27 | End: 2020-08-04

## 2020-07-27 RX ORDER — ISOSORBIDE MONONITRATE 60 MG/1
60 TABLET, EXTENDED RELEASE ORAL DAILY
Refills: 0 | Status: DISCONTINUED | OUTPATIENT
Start: 2020-07-28 | End: 2020-08-04

## 2020-07-27 RX ORDER — ACETAMINOPHEN 500 MG
650 TABLET ORAL ONCE
Refills: 0 | Status: COMPLETED | OUTPATIENT
Start: 2020-07-27 | End: 2020-07-27

## 2020-07-27 RX ADMIN — CARVEDILOL PHOSPHATE 12.5 MILLIGRAM(S): 80 CAPSULE, EXTENDED RELEASE ORAL at 05:51

## 2020-07-27 RX ADMIN — ISOSORBIDE MONONITRATE 30 MILLIGRAM(S): 60 TABLET, EXTENDED RELEASE ORAL at 14:00

## 2020-07-27 RX ADMIN — Medication 1: at 16:21

## 2020-07-27 RX ADMIN — SPIRONOLACTONE 25 MILLIGRAM(S): 25 TABLET, FILM COATED ORAL at 05:51

## 2020-07-27 RX ADMIN — Medication 1 MILLIGRAM(S): at 13:42

## 2020-07-27 RX ADMIN — QUETIAPINE FUMARATE 200 MILLIGRAM(S): 200 TABLET, FILM COATED ORAL at 21:43

## 2020-07-27 RX ADMIN — MEMANTINE HYDROCHLORIDE 5 MILLIGRAM(S): 10 TABLET ORAL at 14:00

## 2020-07-27 RX ADMIN — Medication 3: at 11:51

## 2020-07-27 RX ADMIN — Medication 2: at 08:03

## 2020-07-27 RX ADMIN — INSULIN GLARGINE 30 UNIT(S): 100 INJECTION, SOLUTION SUBCUTANEOUS at 21:44

## 2020-07-27 RX ADMIN — HEPARIN SODIUM 1500 UNIT(S)/HR: 5000 INJECTION INTRAVENOUS; SUBCUTANEOUS at 05:53

## 2020-07-27 RX ADMIN — Medication 162 MILLIGRAM(S): at 14:00

## 2020-07-27 RX ADMIN — Medication 650 MILLIGRAM(S): at 04:59

## 2020-07-27 RX ADMIN — ATORVASTATIN CALCIUM 40 MILLIGRAM(S): 80 TABLET, FILM COATED ORAL at 21:43

## 2020-07-27 RX ADMIN — Medication 650 MILLIGRAM(S): at 05:44

## 2020-07-27 RX ADMIN — CARVEDILOL PHOSPHATE 12.5 MILLIGRAM(S): 80 CAPSULE, EXTENDED RELEASE ORAL at 18:27

## 2020-07-27 NOTE — PROGRESS NOTE ADULT - PROBLEM SELECTOR PLAN 3
AC as per above, currently rate controlled.  Continue carvedilol  Noted thrombus in LA - delpirore consult noted    f/u echo    7/27/2020 spoke to daughter and based off meds pt may have had afib etiology and chronicity unknown , but takes digoxin. currently rate controlled

## 2020-07-27 NOTE — DIETITIAN INITIAL EVALUATION ADULT. - ENERGY NEEDS
Ht (cm): 167.6   Wt (kg): 116 kg ( 07-)     IBW:   64.4   %IBW:  180%   UBW:  unknown    %UBW: unknown

## 2020-07-27 NOTE — DIETITIAN INITIAL EVALUATION ADULT. - PERTINENT MEDS FT
MEDICATIONS  (STANDING):  aspirin  chewable 162 milliGRAM(s) Oral daily  atorvastatin 40 milliGRAM(s) Oral at bedtime  carvedilol 12.5 milliGRAM(s) Oral every 12 hours  dextrose 5%. 1000 milliLiter(s) (50 mL/Hr) IV Continuous <Continuous>  dextrose 50% Injectable 12.5 Gram(s) IV Push once  dextrose 50% Injectable 25 Gram(s) IV Push once  dextrose 50% Injectable 25 Gram(s) IV Push once  heparin  Infusion. 2300 Unit(s)/Hr (23 mL/Hr) IV Continuous <Continuous>  insulin lispro (HumaLOG) corrective regimen sliding scale   SubCutaneous three times a day before meals  isosorbide   mononitrate ER Tablet (IMDUR) 30 milliGRAM(s) Oral daily  memantine 5 milliGRAM(s) Oral daily  QUEtiapine 25 milliGRAM(s) Oral at bedtime  spironolactone 25 milliGRAM(s) Oral daily    MEDICATIONS  (PRN):  dextrose 40% Gel 15 Gram(s) Oral once PRN Blood Glucose LESS THAN 70 milliGRAM(s)/deciliter  glucagon  Injectable 1 milliGRAM(s) IntraMuscular once PRN Glucose LESS THAN 70 milligrams/deciliter  heparin   Injectable 9000 Unit(s) IV Push every 6 hours PRN For aPTT less than 40  heparin   Injectable 4500 Unit(s) IV Push every 6 hours PRN For aPTT between 40 - 57

## 2020-07-27 NOTE — PROGRESS NOTE ADULT - PROBLEM SELECTOR PLAN 1
my colleague wanted rule out infection   blood cultures,  prelim ngtd    UA relatively unremarkable   Has hx of dementia, reportedly current mental status worse than baseline   7/27/2020 per daughter was on aricept  but she didn't give because she said  made more confused   namenda 5mg and Seroquel 200 mg po qhs  ( will resume these

## 2020-07-27 NOTE — DIETITIAN INITIAL EVALUATION ADULT. - OTHER INFO
Pt speaks [place] Creole and English; confused, lethargic. Spoke with CNA and review chart. Pt able to report good appetite. Per nursing staff, pt with good PO intake with meals, no N/V/D/C and no issue with chewing or swallowing. Pt denied any allergies to food. Unable to provide nutrition-related education at this time. Pt c uncontrolled T2DM, a1c 12.3%, average BG x 3 months= 306mg/dL noted. Pt's T2DM home Rx unknown. Pt currently receiving Humalog corrective ss. Waist circumference >40 inches. Provided pt with handout: Meal Planning with the Plate Method with RD business card attached.

## 2020-07-27 NOTE — DIETITIAN INITIAL EVALUATION ADULT. - PERTINENT LABORATORY DATA
07-26 Na139 mmol/L Glu 131 mg/dL<H> K+ 3.6 mmol/L Cr  0.87 mg/dL BUN 11 mg/dL 07-26 Phos 4.0 mg/dL 07-26 Alb 2.2 g/dL<L> 07-26 Chol 212 mg/dL<H>  mg/dL<H> HDL 30 mg/dL<L> Trig 120 mg/dL    07-26-20 @ 11:01A1C 12.3  POCT: 278, 202, 161, 213

## 2020-07-27 NOTE — PROGRESS NOTE ADULT - SUBJECTIVE AND OBJECTIVE BOX
75F with a suspected PMH of CHF s/p PPM, HTN, DM, dementia who was brought to the ED per AMS.  Patient primarily speaks [place] Creole.  Patient currently AAOx1, unable to provide history.  Per ED attending, patient was noted to not be at his baseline.  Reportedly patient was agitated, confused, walking around his house naked.  Reportedly had COVID19 in April.  When EMS arrived, he was found to be in Afib with .  Given diltiazem in the field.  Patient's apartment reportedly extremely hot and patient found extremely diaphoretic.  Vitals stable in ED.  Labs benign.  Preliminary CT findings - L sided pulmonary embolism and thrombus within the L atrium.  Will admit to tele. (2020 03:36)      Chief Complaint:  Patient is a 75y old  Male who presents with a chief complaint of AMS, Afib (2020 09:51)      Review of Systems:    General:  No wt loss, fevers, chills, night sweats  Eyes:  Good vision, no reported pain  ENT:  No sore throat, pain, runny nose, dysphagia  CV:  No pain, palpitations, hypo/hypertension  Resp:  No dyspnea, cough, tachypnea, wheezing  GI:  No pain, nausea, vomiting, diarrhea, constipation  :  No pain, bleeding, incontinence, nocturia           Social History/Family History  SOCHX:   tobacco,  -  alcohol    FMHX: FA/MO  - contributory       Discussed with:  PMD, Family    Physical Exam:    Vital Signs:  Vital Signs Last 24 Hrs  T(C): 36.7 (2020 16:48), Max: 37.3 (2020 01:23)  T(F): 98.1 (2020 16:48), Max: 99.1 (2020 01:23)  HR: 96 (2020 16:48) (85 - 96)  BP: 115/70 (2020 16:48) (105/48 - 131/70)  BP(mean): --  RR: 18 (2020 16:48) (17 - 22)  SpO2: 99% (2020 16:48) (94% - 99%)  Daily Height in cm: 167.64 (2020 16:48)    Daily   I&O's Summary    2020 07:01  -  2020 07:00  --------------------------------------------------------  IN: 0 mL / OUT: 650 mL / NET: -650 mL  2020 07:01  -  2020 21:36  --------------------------------------------------------  IN: 720 mL / OUT: 0 mL / NET: 720 mL          Chest:  Full & symmetric excursion, no increased effort, breath sounds clear  Cardiovascular:  Regular rhythm, S1, S2, no murmur/rub/S3/S4, no carotid/femoral/abdominal bruit, radial/pedal pulses 2+,   Abdomen:  Soft, non-tender, non-distended, normoactive bowel sounds, no HSM      Laboratory:                          13.4   4.62  )-----------( 181      ( 2020 10:32 )             42.4     07-25    142  |  110<H>  |  15  ----------------------------<  215<H>  4.0   |  26  |  1.15    Ca    8.4<L>      2020 00:10    TPro  6.0  /  Alb  2.4<L>  /  TBili  0.4  /  DBili  x   /  AST  36  /  ALT  33  /  AlkPhos  49  07-25      CARDIAC MARKERS ( 2020 00:10 )  .031 ng/mL / x     / x     / x     / x          CAPILLARY BLOOD GLUCOSE      POCT Blood Glucose.: 186 mg/dL (2020 21:23)  POCT Blood Glucose.: 203 mg/dL (2020 16:45)  POCT Blood Glucose.: 74 mg/dL (2020 11:10)  POCT Blood Glucose.: 107 mg/dL (2020 08:00)  POCT Blood Glucose.: 129 mg/dL (2020 05:58)    LIVER FUNCTIONS - ( 2020 00:10 )  Alb: 2.4 g/dL / Pro: 6.0 gm/dL / ALK PHOS: 49 U/L / ALT: 33 U/L / AST: 36 U/L / GGT: x           PT/INR - ( 2020 00:10 )   PT: 14.3 sec;   INR: 1.30 ratio         PTT - ( 2020 17:39 )  PTT:50.4 sec  Urinalysis Basic - ( 2020 04:10 )    Color: Yellow / Appearance: Clear / S.010 / pH: x  Gluc: x / Ketone: Negative  / Bili: Negative / Urobili: Negative mg/dL   Blood: x / Protein: 15 mg/dL / Nitrite: Negative   Leuk Esterase: Negative / RBC: 3-5 /HPF / WBC 0-2   Sq Epi: x / Non Sq Epi: Occasional / Bacteria: Negative      Assessment:  the patient's primary diagnosis was altered mental status  Upon review of the chart due to the patient not giving an adequate history through an  they appear confused  the CAT scan angiogram reveals a pulmonary embolus and a possible extension into the atrium of a thrombus  Treatment for both of these issues remains the same with heparin intravenous continuing  A diagnostic echocardiogram is not completed  Cardiac enzymes remain normal, BNP level is low  EKG is noted And vital signs remained stable on current regimen

## 2020-07-27 NOTE — PROGRESS NOTE ADULT - PROBLEM SELECTOR PLAN 8
insulin corrective scale   hgba1c 12.8   per daughter takes lantus 44 units at night will start with 30 units and increase  as needed

## 2020-07-27 NOTE — PROGRESS NOTE ADULT - SUBJECTIVE AND OBJECTIVE BOX
Patient is a 75y old  Male who presents with a chief complaint of AMS, Afib (25 Jul 2020 03:36)      OVERNIGHT EVENTS: none  MEDICATIONS  (STANDING):  aspirin  chewable 162 milliGRAM(s) Oral daily  atorvastatin 40 milliGRAM(s) Oral at bedtime  carvedilol 12.5 milliGRAM(s) Oral every 12 hours  dextrose 5%. 1000 milliLiter(s) (50 mL/Hr) IV Continuous <Continuous>  dextrose 50% Injectable 12.5 Gram(s) IV Push once  dextrose 50% Injectable 25 Gram(s) IV Push once  dextrose 50% Injectable 25 Gram(s) IV Push once  heparin  Infusion. 2300 Unit(s)/Hr (23 mL/Hr) IV Continuous <Continuous>  insulin lispro (HumaLOG) corrective regimen sliding scale   SubCutaneous three times a day before meals  isosorbide   mononitrate ER Tablet (IMDUR) 30 milliGRAM(s) Oral daily  memantine 5 milliGRAM(s) Oral daily  QUEtiapine 25 milliGRAM(s) Oral at bedtime  spironolactone 25 milliGRAM(s) Oral daily    MEDICATIONS  (PRN):  dextrose 40% Gel 15 Gram(s) Oral once PRN Blood Glucose LESS THAN 70 milliGRAM(s)/deciliter  glucagon  Injectable 1 milliGRAM(s) IntraMuscular once PRN Glucose LESS THAN 70 milligrams/deciliter  heparin   Injectable 9000 Unit(s) IV Push every 6 hours PRN For aPTT less than 40  heparin   Injectable 4500 Unit(s) IV Push every 6 hours PRN For aPTT between 40 - 57    Allergies    No Known Allergies    Intolerances        SUBJECTIVE: in bed in NAD, no acute events overnight     Vital Signs Last 24 Hrs  T(C): 36.9 (27 Jul 2020 05:27), Max: 37.1 (26 Jul 2020 16:50)  T(F): 98.5 (27 Jul 2020 05:27), Max: 98.8 (26 Jul 2020 16:50)  HR: 84 (27 Jul 2020 12:43) (84 - 100)  BP: 135/70 (27 Jul 2020 12:43) (113/78 - 162/110)  BP(mean): --  RR: 18 (27 Jul 2020 12:43) (18 - 20)  SpO2: 98% (27 Jul 2020 12:43) (98% - 100%)    PHYSICAL EXAM:  GENERAL: NAD, well-groomed, well-developed  HEAD:  Atraumatic, Normocephalic  EYES: EOMI, PERRLA, conjunctiva and sclera clear  ENMT: No tonsillar erythema, exudates, or enlargement; Moist mucous membranes, Good dentition, No lesions  NECK: Supple, No JVD, Normal thyroid  CHEST/LUNG: Clear to  auscultation bilaterally; No rales, rhonchi, wheezing, or rubs  bilaterally  HEART: iiregular rate and rhythm; No murmurs, rubs, or gallops  ABDOMEN: Soft, Nontender, Nondistended; Bowel sounds present  EXTREMITIES:  2+ Peripheral Pulses, No clubbing, cyanosis, or edema BL LE  SKIN: No rashes or lesions  NERVOUS SYSTEM:  Alert, and oriented to person and place not time    DTRs 2+ intact and symmetric, sensation intact BL    LABS:                                        12.9   5.06  )-----------( 177      ( 27 Jul 2020 05:20 )             42.2   07-26    139  |  107  |  11  ----------------------------<  131<H>  3.6   |  26  |  0.87    Ca    8.2<L>      26 Jul 2020 07:47  Phos  4.0     07-26  Mg     2.1     07-26    TPro  5.7<L>  /  Alb  2.2<L>  /  TBili  0.4  /  DBili  x   /  AST  37  /  ALT  33  /  AlkPhos  43  07-26  PTT - ( 27 Jul 2020 05:20 )  PTT:71.6 sec           Cultures;   CAPILLARY BLOOD GLUCOSE  CAPILLARY BLOOD GLUCOSE      POCT Blood Glucose.: 182 mg/dL (27 Jul 2020 15:37)  POCT Blood Glucose.: 278 mg/dL (27 Jul 2020 11:04)  POCT Blood Glucose.: 202 mg/dL (27 Jul 2020 07:15)  POCT Blood Glucose.: 161 mg/dL (26 Jul 2020 21:57)  POCT Blood Glucose.: 213 mg/dL (26 Jul 2020 16:59)      Lipid panel:     CARDIAC MARKERS ( 25 Jul 2020 00:10 )  .031 ng/mL / x     / x     / x     / x          Lipid Profile in AM (07.26.20 @ 11:04)    Total Cholesterol/HDL Ratio Measurement: 7.1 RATIO    Cholesterol, Serum: 212 mg/dL    Triglycerides, Serum: 120 mg/dL    HDL Cholesterol, Serum: 30: HDL Levels >/= 60 mg/dL are considered beneficial and a "negative" risk  factor.  Effective 08/15/2018: New reference range and interpretive comment. mg/dL    Direct LDL: 158: LDL Cholesterol (mg/dL) --- Interpretive Comment (for adults 18 and over)  Optimal LDL Level may vary based on clinical situation  Below 70                   Ideal for people at very high risk of heart  disease  Below 100                  Ideal for people at risk of heart disease  100 - 129                    [place]  130 - 159                    Borderline high  160 - 189                    High  190 and Above           Very high mg/dL        RADIOLOGY & ADDITIONAL TESTS:  < from: Xray Chest 1 View- PORTABLE-Routine (07.25.20 @ 06:44) >  IMPRESSION: Mild increased pulmonary vascular congestion noted. No focal consolidation is seen. Status post valve replacement.        < from: CT Abdomen and Pelvis w/ IV Cont (07.25.20 @ 04:58) >  IMPRESSION:    1.  No acute CT abnormality in the abdomen and pelvis. Please refer to the separately dictated CT chest report for critical findings.    < end of copied text >    < from: CT Head No Cont (07.25.20 @ 04:58) >    No evidence of acute intracranial hemorrhage or midline shift.    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 There is no other CT evidence of acute territorial infarct. If the patient's symptoms persist, consider short interval follow-up head CT or brain MRI if there are no MRI contraindications.            < end of copied text >      Imaging Personally Reviewed:  [ x] YES      Consultant(s) Notes Reviewed:  [x ] YES     Care Discussed with [x ] Consultants [X ] Patient [x ] Family  [x ]    [x ]  Other; RN

## 2020-07-27 NOTE — DIETITIAN INITIAL EVALUATION ADULT. - ETIOLOGY
physiological causes requiring modified carbohydrate intake (uncontrolled T2DM), food/nutrition-related knowledge deficit

## 2020-07-27 NOTE — PROGRESS NOTE ADULT - PROBLEM SELECTOR PLAN 4
age indeterminant infarct in left BG   unclear given presence of Mitral valve repair and sternotomy can obtain MRI .    nevertheless checked lipid panel  noted continue with statin   continue preventive measures and treatment for cva    get pt/ot

## 2020-07-27 NOTE — CHART NOTE - NSCHARTNOTEFT_GEN_A_CORE
Upon Nutritional Assessment by the Registered Dietitian your patient was determined to meet criteria / has evidence of the following diagnosis/diagnoses:          [ ]  Mild Protein Calorie Malnutrition        [ ]  Moderate Protein Calorie Malnutrition        [ ] Severe Protein Calorie Malnutrition        [ ] Unspecified Protein Calorie Malnutrition        [ ] Underweight / BMI <19        [x ] Morbid Obesity / BMI > 40      Findings as based on:  •  Comprehensive nutrition assessment and consultation  •  Calorie counts (nutrient intake analysis)  •  Food acceptance and intake status from observations by staff  •  Follow up  •  Patient education  •  Intervention secondary to interdisciplinary rounds  •   concerns      Treatment:    The following diet has been recommended: Consistent Carbohydrate, DASH/TLC diet       PROVIDER Section:     By signing this assessment you are acknowledging and agree with the diagnosis/diagnoses assigned by the Registered Dietitian    Comments:

## 2020-07-28 LAB
ANION GAP SERPL CALC-SCNC: 7 MMOL/L — SIGNIFICANT CHANGE UP (ref 5–17)
APTT BLD: 46.5 SEC — HIGH (ref 27.5–35.5)
APTT BLD: 88.1 SEC — HIGH (ref 27.5–35.5)
BUN SERPL-MCNC: 14 MG/DL — SIGNIFICANT CHANGE UP (ref 7–23)
CALCIUM SERPL-MCNC: 8.2 MG/DL — LOW (ref 8.5–10.1)
CHLORIDE SERPL-SCNC: 108 MMOL/L — SIGNIFICANT CHANGE UP (ref 96–108)
CO2 SERPL-SCNC: 27 MMOL/L — SIGNIFICANT CHANGE UP (ref 22–31)
CREAT SERPL-MCNC: 0.94 MG/DL — SIGNIFICANT CHANGE UP (ref 0.5–1.3)
GLUCOSE BLDC GLUCOMTR-MCNC: 174 MG/DL — HIGH (ref 70–99)
GLUCOSE BLDC GLUCOMTR-MCNC: 231 MG/DL — HIGH (ref 70–99)
GLUCOSE BLDC GLUCOMTR-MCNC: 260 MG/DL — HIGH (ref 70–99)
GLUCOSE BLDC GLUCOMTR-MCNC: 365 MG/DL — HIGH (ref 70–99)
GLUCOSE SERPL-MCNC: 186 MG/DL — HIGH (ref 70–99)
HCT VFR BLD CALC: 40.4 % — SIGNIFICANT CHANGE UP (ref 39–50)
HGB BLD-MCNC: 12.2 G/DL — LOW (ref 13–17)
MAGNESIUM SERPL-MCNC: 2 MG/DL — SIGNIFICANT CHANGE UP (ref 1.6–2.6)
MCHC RBC-ENTMCNC: 27.7 PG — SIGNIFICANT CHANGE UP (ref 27–34)
MCHC RBC-ENTMCNC: 30.2 GM/DL — LOW (ref 32–36)
MCV RBC AUTO: 91.8 FL — SIGNIFICANT CHANGE UP (ref 80–100)
NRBC # BLD: 0 /100 WBCS — SIGNIFICANT CHANGE UP (ref 0–0)
PHOSPHATE SERPL-MCNC: 3.2 MG/DL — SIGNIFICANT CHANGE UP (ref 2.5–4.5)
PLATELET # BLD AUTO: 191 K/UL — SIGNIFICANT CHANGE UP (ref 150–400)
POTASSIUM SERPL-MCNC: 3.7 MMOL/L — SIGNIFICANT CHANGE UP (ref 3.5–5.3)
POTASSIUM SERPL-SCNC: 3.7 MMOL/L — SIGNIFICANT CHANGE UP (ref 3.5–5.3)
RBC # BLD: 4.4 M/UL — SIGNIFICANT CHANGE UP (ref 4.2–5.8)
RBC # FLD: 16 % — HIGH (ref 10.3–14.5)
SODIUM SERPL-SCNC: 142 MMOL/L — SIGNIFICANT CHANGE UP (ref 135–145)
WBC # BLD: 3.94 K/UL — SIGNIFICANT CHANGE UP (ref 3.8–10.5)
WBC # FLD AUTO: 3.94 K/UL — SIGNIFICANT CHANGE UP (ref 3.8–10.5)

## 2020-07-28 PROCEDURE — 99233 SBSQ HOSP IP/OBS HIGH 50: CPT

## 2020-07-28 RX ORDER — RIVAROXABAN 15 MG-20MG
15 KIT ORAL
Refills: 0 | Status: DISCONTINUED | OUTPATIENT
Start: 2020-07-28 | End: 2020-08-04

## 2020-07-28 RX ADMIN — HEPARIN SODIUM 4500 UNIT(S): 5000 INJECTION INTRAVENOUS; SUBCUTANEOUS at 07:27

## 2020-07-28 RX ADMIN — Medication 3: at 18:44

## 2020-07-28 RX ADMIN — HEPARIN SODIUM 1700 UNIT(S)/HR: 5000 INJECTION INTRAVENOUS; SUBCUTANEOUS at 07:23

## 2020-07-28 RX ADMIN — MEMANTINE HYDROCHLORIDE 5 MILLIGRAM(S): 10 TABLET ORAL at 13:02

## 2020-07-28 RX ADMIN — SPIRONOLACTONE 25 MILLIGRAM(S): 25 TABLET, FILM COATED ORAL at 06:28

## 2020-07-28 RX ADMIN — QUETIAPINE FUMARATE 200 MILLIGRAM(S): 200 TABLET, FILM COATED ORAL at 21:29

## 2020-07-28 RX ADMIN — Medication 5: at 13:01

## 2020-07-28 RX ADMIN — Medication 162 MILLIGRAM(S): at 13:03

## 2020-07-28 RX ADMIN — CARVEDILOL PHOSPHATE 12.5 MILLIGRAM(S): 80 CAPSULE, EXTENDED RELEASE ORAL at 06:28

## 2020-07-28 RX ADMIN — RIVAROXABAN 15 MILLIGRAM(S): KIT at 18:42

## 2020-07-28 RX ADMIN — INSULIN GLARGINE 30 UNIT(S): 100 INJECTION, SOLUTION SUBCUTANEOUS at 21:30

## 2020-07-28 RX ADMIN — CARVEDILOL PHOSPHATE 12.5 MILLIGRAM(S): 80 CAPSULE, EXTENDED RELEASE ORAL at 18:45

## 2020-07-28 RX ADMIN — ATORVASTATIN CALCIUM 40 MILLIGRAM(S): 80 TABLET, FILM COATED ORAL at 21:28

## 2020-07-28 RX ADMIN — ISOSORBIDE MONONITRATE 60 MILLIGRAM(S): 60 TABLET, EXTENDED RELEASE ORAL at 13:02

## 2020-07-28 NOTE — PROGRESS NOTE ADULT - PROBLEM SELECTOR PLAN 2
was Started on heparin drip by my colleague    f/u echo    also noted JUAN thrombus on CT chest    obtained  Cardiology consult may need AGUSTÍN if TTE unrevealing will defer to cardiology
was Started on heparin drip by my colleague    f/u echo    also noted JUAN thrombus on CT chest    obtained  Cardiology consult may need AGUSTÍN if TTE unrevealing will defer to cardiology  7/27/2020  still waiting for echo
was Started on heparin drip by my colleague    f/u echo    also noted JUAN thrombus on CT chest    obtained  Cardiology consult may need AGUSTÍN if TTE unrevealing will defer to cardiology  7/27/2020  still waiting for echo  7/28/2020 change to NOAC
was Started on heparin drip by my colleague    f/u echo    also noted JUAN thrombus on CT chest  will get Cardiology consult may need AGUSTÍN if TTE unrevealing will defer to cardiology

## 2020-07-28 NOTE — PROGRESS NOTE ADULT - SUBJECTIVE AND OBJECTIVE BOX
75F with a suspected PMH of CHF s/p PPM, HTN, DM, dementia who was brought to the ED per AMS.  Patient primarily speaks [place] Creole.  Patient currently AAOx1, unable to provide history.  Per ED attending, patient was noted to not be at his baseline.  Reportedly patient was agitated, confused, walking around his house naked.  Reportedly had COVID19 in April.  When EMS arrived, he was found to be in Afib with .  Given diltiazem in the field.  Patient's apartment reportedly extremely hot and patient found extremely diaphoretic.  Vitals stable in ED.  Labs benign.  Preliminary CT findings - L sided pulmonary embolism and thrombus within the L atrium.  Will admit to tele. (2020 03:36)      Chief Complaint:  Patient is a 75y old  Male who presents with a chief complaint of AMS, Afib (2020 09:51)      Review of Systems:    General:  No wt loss, fevers, chills, night sweats  Eyes:  Good vision, no reported pain  ENT:  No sore throat, pain, runny nose, dysphagia  CV:  No pain, palpitations, hypo/hypertension  Resp:  No dyspnea, cough, tachypnea, wheezing  GI:  No pain, nausea, vomiting, diarrhea, constipation  :  No pain, bleeding, incontinence, nocturia           Social History/Family History  SOCHX:   tobacco,  -  alcohol    FMHX: FA/MO  - contributory       Discussed with:  PMD, Family    Physical Exam:    Vital Signs:  Vital Signs Last 24 Hrs  T(C): 36.7 (2020 16:48), Max: 37.3 (2020 01:23)  T(F): 98.1 (2020 16:48), Max: 99.1 (2020 01:23)  HR: 96 (2020 16:48) (85 - 96)  BP: 115/70 (2020 16:48) (105/48 - 131/70)  BP(mean): --  RR: 18 (2020 16:48) (17 - 22)  SpO2: 99% (2020 16:48) (94% - 99%)  Daily Height in cm: 167.64 (2020 16:48)    Daily   I&O's Summary    2020 07:01  -  2020 07:00  --------------------------------------------------------  IN: 0 mL / OUT: 650 mL / NET: -650 mL  2020 07:01  -  2020 21:36  --------------------------------------------------------  IN: 720 mL / OUT: 0 mL / NET: 720 mL          Chest:  Full & symmetric excursion, no increased effort, breath sounds clear  Cardiovascular:  Regular rhythm, S1, S2, no murmur/rub/S3/S4, no carotid/femoral/abdominal bruit, radial/pedal pulses 2+,   Abdomen:  Soft, non-tender, non-distended, normoactive bowel sounds, no HSM      Laboratory:                          13.4   4.62  )-----------( 181      ( 2020 10:32 )             42.4     07-25    142  |  110<H>  |  15  ----------------------------<  215<H>  4.0   |  26  |  1.15    Ca    8.4<L>      2020 00:10    TPro  6.0  /  Alb  2.4<L>  /  TBili  0.4  /  DBili  x   /  AST  36  /  ALT  33  /  AlkPhos  49  07-25      CARDIAC MARKERS ( 2020 00:10 )  .031 ng/mL / x     / x     / x     / x          CAPILLARY BLOOD GLUCOSE      POCT Blood Glucose.: 186 mg/dL (2020 21:23)  POCT Blood Glucose.: 203 mg/dL (2020 16:45)  POCT Blood Glucose.: 74 mg/dL (2020 11:10)  POCT Blood Glucose.: 107 mg/dL (2020 08:00)  POCT Blood Glucose.: 129 mg/dL (2020 05:58)    LIVER FUNCTIONS - ( 2020 00:10 )  Alb: 2.4 g/dL / Pro: 6.0 gm/dL / ALK PHOS: 49 U/L / ALT: 33 U/L / AST: 36 U/L / GGT: x           PT/INR - ( 2020 00:10 )   PT: 14.3 sec;   INR: 1.30 ratio         PTT - ( 2020 17:39 )  PTT:50.4 sec  Urinalysis Basic - ( 2020 04:10 )    Color: Yellow / Appearance: Clear / S.010 / pH: x  Gluc: x / Ketone: Negative  / Bili: Negative / Urobili: Negative mg/dL   Blood: x / Protein: 15 mg/dL / Nitrite: Negative   Leuk Esterase: Negative / RBC: 3-5 /HPF / WBC 0-2   Sq Epi: x / Non Sq Epi: Occasional / Bacteria: Negative      Assessment:  the patient's primary diagnosis was altered mental status  Upon review of the chart due to the patient not giving an adequate history through an  they appear confused  the CAT scan angiogram reveals a pulmonary embolus and a possible extension into the atrium of a thrombus  Treatment for both of these issues remains the same with heparin intravenous continuing  A diagnostic echocardiogram is completed, await official result?  Cardiac enzymes remain normal, BNP level is low  EKG is noted And vital signs remained stable on current regimen

## 2020-07-28 NOTE — PROGRESS NOTE ADULT - ATTENDING COMMENTS
finally spoke to daughter at 
finally spoke to daughter at 
left message on VM from number in chart
left message on VM from number in chart

## 2020-07-28 NOTE — PROGRESS NOTE ADULT - SUBJECTIVE AND OBJECTIVE BOX
Patient is a 75y old  Male who presents with a chief complaint of AMS, Afib (25 Jul 2020 03:36)      OVERNIGHT EVENTS: none    MEDICATIONS  (STANDING):  aspirin  chewable 162 milliGRAM(s) Oral daily  atorvastatin 40 milliGRAM(s) Oral at bedtime  carvedilol 12.5 milliGRAM(s) Oral every 12 hours  dextrose 5%. 1000 milliLiter(s) (50 mL/Hr) IV Continuous <Continuous>  dextrose 50% Injectable 12.5 Gram(s) IV Push once  dextrose 50% Injectable 25 Gram(s) IV Push once  dextrose 50% Injectable 25 Gram(s) IV Push once  heparin  Infusion. 2300 Unit(s)/Hr (23 mL/Hr) IV Continuous <Continuous>  insulin glargine Injectable (LANTUS) 30 Unit(s) SubCutaneous at bedtime  insulin lispro (HumaLOG) corrective regimen sliding scale   SubCutaneous three times a day before meals  isosorbide   mononitrate ER Tablet (IMDUR) 60 milliGRAM(s) Oral daily  memantine 5 milliGRAM(s) Oral daily  QUEtiapine 200 milliGRAM(s) Oral at bedtime  spironolactone 25 milliGRAM(s) Oral daily    MEDICATIONS  (PRN):  dextrose 40% Gel 15 Gram(s) Oral once PRN Blood Glucose LESS THAN 70 milliGRAM(s)/deciliter  glucagon  Injectable 1 milliGRAM(s) IntraMuscular once PRN Glucose LESS THAN 70 milligrams/deciliter  heparin   Injectable 9000 Unit(s) IV Push every 6 hours PRN For aPTT less than 40  heparin   Injectable 4500 Unit(s) IV Push every 6 hours PRN For aPTT between 40 - 57    Allergies    No Known Allergies    Intolerances        SUBJECTIVE: in bed in NAD, no acute events overnight     Vital Signs Last 24 Hrs  T(C): 37.1 (28 Jul 2020 10:35), Max: 37.1 (28 Jul 2020 10:35)  T(F): 98.8 (28 Jul 2020 10:35), Max: 98.8 (28 Jul 2020 10:35)  HR: 99 (28 Jul 2020 10:35) (77 - 99)  BP: 125/71 (28 Jul 2020 10:35) (108/59 - 141/89)  BP(mean): --  RR: 17 (28 Jul 2020 10:35) (17 - 18)  SpO2: 98% (28 Jul 2020 10:35) (94% - 98%)      PHYSICAL EXAM:  GENERAL: NAD, well-groomed, well-developed  HEAD:  Atraumatic, Normocephalic  EYES: EOMI, PERRLA, conjunctiva and sclera clear  ENMT: No tonsillar erythema, exudates, or enlargement; Moist mucous membranes, Good dentition, No lesions  NECK: Supple, No JVD, Normal thyroid  CHEST/LUNG: Clear to  auscultation bilaterally; No rales, rhonchi, wheezing, or rubs  bilaterally  HEART: irregular rate and rhythm; No murmurs, rubs, or gallops  ABDOMEN: Soft, Nontender, Nondistended; Bowel sounds present  EXTREMITIES:  2+ Peripheral Pulses, No clubbing, cyanosis, or edema BL LE  SKIN: No rashes or lesions  NERVOUS SYSTEM:  Alert, and oriented to person and place not time    DTRs 2+ intact and symmetric, sensation intact BL    LABS:                                            12.2   3.94  )-----------( 191      ( 28 Jul 2020 06:18 )             40.4   07-28    142  |  108  |  14  ----------------------------<  186<H>  3.7   |  27  |  0.94    Ca    8.2<L>      28 Jul 2020 06:20  Phos  3.2     07-28  Mg     2.0     07-28           Cultures;     CAPILLARY BLOOD GLUCOSE  POCT Blood Glucose.: 365 mg/dL (28 Jul 2020 11:24)  POCT Blood Glucose.: 174 mg/dL (28 Jul 2020 07:20)  POCT Blood Glucose.: 152 mg/dL (27 Jul 2020 21:41)  POCT Blood Glucose.: 182 mg/dL (27 Jul 2020 15:37)    Lipid panel:     CARDIAC MARKERS ( 25 Jul 2020 00:10 )  .031 ng/mL / x     / x     / x     / x          Lipid Profile in AM (07.26.20 @ 11:04)    Total Cholesterol/HDL Ratio Measurement: 7.1 RATIO    Cholesterol, Serum: 212 mg/dL    Triglycerides, Serum: 120 mg/dL    HDL Cholesterol, Serum: 30: HDL Levels >/= 60 mg/dL are considered beneficial and a "negative" risk  factor.  Effective 08/15/2018: New reference range and interpretive comment. mg/dL    Direct LDL: 158: LDL Cholesterol (mg/dL) --- Interpretive Comment (for adults 18 and over)  Optimal LDL Level may vary based on clinical situation  Below 70                   Ideal for people at very high risk of heart  disease  Below 100                  Ideal for people at risk of heart disease  100 - 129                    [place]  130 - 159                    Borderline high  160 - 189                    High  190 and Above           Very high mg/dL        RADIOLOGY & ADDITIONAL TESTS:  < from: Xray Chest 1 View- PORTABLE-Routine (07.25.20 @ 06:44) >  IMPRESSION: Mild increased pulmonary vascular congestion noted. No focal consolidation is seen. Status post valve replacement.        < from: CT Abdomen and Pelvis w/ IV Cont (07.25.20 @ 04:58) >  IMPRESSION:    1.  No acute CT abnormality in the abdomen and pelvis. Please refer to the separately dictated CT chest report for critical findings.    < end of copied text >    < from: CT Head No Cont (07.25.20 @ 04:58) >    No evidence of acute intracranial hemorrhage or midline shift.    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 There is no other CT evidence of acute territorial infarct. If the patient's symptoms persist, consider short interval follow-up head CT or brain MRI if there are no MRI contraindications.            < end of copied text >      Imaging Personally Reviewed:  [ x] YES      Consultant(s) Notes Reviewed:  [x ] YES     Care Discussed with [x ] Consultants [X ] Patient [x ] Family  [x ]    [x ]  Other; RN

## 2020-07-29 LAB
GLUCOSE BLDC GLUCOMTR-MCNC: 157 MG/DL — HIGH (ref 70–99)
GLUCOSE BLDC GLUCOMTR-MCNC: 173 MG/DL — HIGH (ref 70–99)
GLUCOSE BLDC GLUCOMTR-MCNC: 188 MG/DL — HIGH (ref 70–99)
GLUCOSE BLDC GLUCOMTR-MCNC: 229 MG/DL — HIGH (ref 70–99)
HCT VFR BLD CALC: 42.6 % — SIGNIFICANT CHANGE UP (ref 39–50)
HGB BLD-MCNC: 13 G/DL — SIGNIFICANT CHANGE UP (ref 13–17)
MCHC RBC-ENTMCNC: 27.5 PG — SIGNIFICANT CHANGE UP (ref 27–34)
MCHC RBC-ENTMCNC: 30.5 GM/DL — LOW (ref 32–36)
MCV RBC AUTO: 90.3 FL — SIGNIFICANT CHANGE UP (ref 80–100)
NRBC # BLD: 0 /100 WBCS — SIGNIFICANT CHANGE UP (ref 0–0)
PLATELET # BLD AUTO: 210 K/UL — SIGNIFICANT CHANGE UP (ref 150–400)
RBC # BLD: 4.72 M/UL — SIGNIFICANT CHANGE UP (ref 4.2–5.8)
RBC # FLD: 16.1 % — HIGH (ref 10.3–14.5)
WBC # BLD: 4.9 K/UL — SIGNIFICANT CHANGE UP (ref 3.8–10.5)
WBC # FLD AUTO: 4.9 K/UL — SIGNIFICANT CHANGE UP (ref 3.8–10.5)

## 2020-07-29 PROCEDURE — 99233 SBSQ HOSP IP/OBS HIGH 50: CPT

## 2020-07-29 RX ADMIN — ATORVASTATIN CALCIUM 40 MILLIGRAM(S): 80 TABLET, FILM COATED ORAL at 21:02

## 2020-07-29 RX ADMIN — INSULIN GLARGINE 30 UNIT(S): 100 INJECTION, SOLUTION SUBCUTANEOUS at 21:02

## 2020-07-29 RX ADMIN — CARVEDILOL PHOSPHATE 12.5 MILLIGRAM(S): 80 CAPSULE, EXTENDED RELEASE ORAL at 17:08

## 2020-07-29 RX ADMIN — CARVEDILOL PHOSPHATE 12.5 MILLIGRAM(S): 80 CAPSULE, EXTENDED RELEASE ORAL at 11:17

## 2020-07-29 RX ADMIN — Medication 1: at 11:35

## 2020-07-29 RX ADMIN — SPIRONOLACTONE 25 MILLIGRAM(S): 25 TABLET, FILM COATED ORAL at 05:26

## 2020-07-29 RX ADMIN — Medication 162 MILLIGRAM(S): at 11:34

## 2020-07-29 RX ADMIN — Medication 1: at 07:53

## 2020-07-29 RX ADMIN — ISOSORBIDE MONONITRATE 60 MILLIGRAM(S): 60 TABLET, EXTENDED RELEASE ORAL at 11:17

## 2020-07-29 RX ADMIN — QUETIAPINE FUMARATE 200 MILLIGRAM(S): 200 TABLET, FILM COATED ORAL at 21:02

## 2020-07-29 RX ADMIN — RIVAROXABAN 15 MILLIGRAM(S): KIT at 17:08

## 2020-07-29 RX ADMIN — MEMANTINE HYDROCHLORIDE 5 MILLIGRAM(S): 10 TABLET ORAL at 11:17

## 2020-07-29 RX ADMIN — RIVAROXABAN 15 MILLIGRAM(S): KIT at 07:54

## 2020-07-29 NOTE — PROGRESS NOTE ADULT - SUBJECTIVE AND OBJECTIVE BOX
75F with a suspected PMH of CHF s/p PPM, HTN, DM, dementia who was brought to the ED per AMS.  Patient primarily speaks [place] Creole.  Patient currently AAOx1, unable to provide history.  Per ED attending, patient was noted to not be at his baseline.  Reportedly patient was agitated, confused, walking around his house naked.  Reportedly had COVID19 in April.  When EMS arrived, he was found to be in Afib with .  Given diltiazem in the field.  Patient's apartment reportedly extremely hot and patient found extremely diaphoretic.  Vitals stable in ED.  Labs benign.  Preliminary CT findings - L sided pulmonary embolism and thrombus within the L atrium.  Will admit to tele. (2020 03:36)      Chief Complaint:  Patient is a 75y old  Male who presents with a chief complaint of AMS, Afib (2020 09:51)      Review of Systems:    General:  No wt loss, fevers, chills, night sweats  Eyes:  Good vision, no reported pain  ENT:  No sore throat, pain, runny nose, dysphagia  CV:  No pain, palpitations, hypo/hypertension  Resp:  No dyspnea, cough, tachypnea, wheezing  GI:  No pain, nausea, vomiting, diarrhea, constipation  :  No pain, bleeding, incontinence, nocturia           Social History/Family History  SOCHX:   tobacco,  -  alcohol    FMHX: FA/MO  - contributory       Discussed with:  PMD, Family    Physical Exam:    Vital Signs:  Vital Signs Last 24 Hrs  T(C): 36.7 (2020 16:48), Max: 37.3 (2020 01:23)  T(F): 98.1 (2020 16:48), Max: 99.1 (2020 01:23)  HR: 96 (2020 16:48) (85 - 96)  BP: 115/70 (2020 16:48) (105/48 - 131/70)  BP(mean): --  RR: 18 (2020 16:48) (17 - 22)  SpO2: 99% (2020 16:48) (94% - 99%)  Daily Height in cm: 167.64 (2020 16:48)    Daily   I&O's Summary    2020 07:01  -  2020 07:00  --------------------------------------------------------  IN: 0 mL / OUT: 650 mL / NET: -650 mL  2020 07:01  -  2020 21:36  --------------------------------------------------------  IN: 720 mL / OUT: 0 mL / NET: 720 mL          Chest:  Full & symmetric excursion, no increased effort, breath sounds clear  Cardiovascular:  Regular rhythm, S1, S2, no murmur/rub/S3/S4, no carotid/femoral/abdominal bruit, radial/pedal pulses 2+,   Abdomen:  Soft, non-tender, non-distended, normoactive bowel sounds, no HSM      Laboratory:                          13.4   4.62  )-----------( 181      ( 2020 10:32 )             42.4     07-25    142  |  110<H>  |  15  ----------------------------<  215<H>  4.0   |  26  |  1.15    Ca    8.4<L>      2020 00:10    TPro  6.0  /  Alb  2.4<L>  /  TBili  0.4  /  DBili  x   /  AST  36  /  ALT  33  /  AlkPhos  49  07-25      CARDIAC MARKERS ( 2020 00:10 )  .031 ng/mL / x     / x     / x     / x          CAPILLARY BLOOD GLUCOSE      POCT Blood Glucose.: 186 mg/dL (2020 21:23)  POCT Blood Glucose.: 203 mg/dL (2020 16:45)  POCT Blood Glucose.: 74 mg/dL (2020 11:10)  POCT Blood Glucose.: 107 mg/dL (2020 08:00)  POCT Blood Glucose.: 129 mg/dL (2020 05:58)    LIVER FUNCTIONS - ( 2020 00:10 )  Alb: 2.4 g/dL / Pro: 6.0 gm/dL / ALK PHOS: 49 U/L / ALT: 33 U/L / AST: 36 U/L / GGT: x           PT/INR - ( 2020 00:10 )   PT: 14.3 sec;   INR: 1.30 ratio         PTT - ( 2020 17:39 )  PTT:50.4 sec  Urinalysis Basic - ( 2020 04:10 )    Color: Yellow / Appearance: Clear / S.010 / pH: x  Gluc: x / Ketone: Negative  / Bili: Negative / Urobili: Negative mg/dL   Blood: x / Protein: 15 mg/dL / Nitrite: Negative   Leuk Esterase: Negative / RBC: 3-5 /HPF / WBC 0-2   Sq Epi: x / Non Sq Epi: Occasional / Bacteria: Negative      Assessment:  the patient's primary diagnosis was altered mental status  the CAT scan angiogram reveals a pulmonary embolus and a possible extension into the atrium of a thrombus  Treatment for both of these issues remains the same with heparin intravenous continuing  A diagnostic echocardiogram is completed,   Cardiac enzymes remain normal, BNP level is low  EKG is noted  Bradycardia noted  To have pacer assessed

## 2020-07-29 NOTE — PROGRESS NOTE ADULT - SUBJECTIVE AND OBJECTIVE BOX
CHIEF COMPLAINT: bradycardia in 30's reported by nurse. patient was sleeping at that Time.  no report of any vomiting, fever or diarrhea        PHYSICAL EXAM:    GENERAL: Moderately built,   CHEST/LUNG: Clear to ausculation bilaterally, no wheezing, no crackles   HEART: Irregularly irregular,   ABDOMEN: Soft, Nontender, Nondistended; Bowel sounds present  EXTREMITIES:  Moving all four extremities spontaneously, No clubbing, cyanosis, or edema  NERVOUS SYSTEM:  Grossly non focal.  Psychiatry: AAO x name only, mood is appropriate       OBJECTIVE DATA:   Vital Signs Last 24 Hrs  T(C): 37.1 (2020 16:55), Max: 37.1 (2020 16:55)  T(F): 98.8 (2020 16:55), Max: 98.8 (2020 16:55)  HR: 82 (2020 16:55) (79 - 96)  BP: 141/78 (2020 16:55) (105/56 - 141/78)  BP(mean): --  RR: 18 (2020 16:55) (16 - 18)  SpO2: 98% (2020 16:55) (95% - 100%)           Daily     Daily Weight in k.5 (2020 05:10)  LABS:                        13.0   4.90  )-----------( 210      ( 2020 06:54 )             42.6             07-    142  |  108  |  14  ----------------------------<  186<H>  3.7   |  27  |  0.94    Ca    8.2<L>      2020 06:20  Phos  3.2     -  Mg     2.0     -                PTT - ( 2020 14:02 )  PTT:88.1 sec          CAPILLARY BLOOD GLUCOSE      POCT Blood Glucose.: 188 mg/dL (2020 15:59)      Culture - Blood  Source: .Blood Blood  Preliminary Report ():    No growth to date.    Culture - Blood  Source: .Blood Blood  Preliminary Report ():    No growth to date.    tele reviewed by me showed a fib rate controlled and also slow ventricular response last night   	  MEDICATIONS  (STANDING):  aspirin  chewable 162 milliGRAM(s) Oral daily  atorvastatin 40 milliGRAM(s) Oral at bedtime  carvedilol 12.5 milliGRAM(s) Oral every 12 hours  dextrose 5%. 1000 milliLiter(s) (50 mL/Hr) IV Continuous <Continuous>  dextrose 50% Injectable 12.5 Gram(s) IV Push once  dextrose 50% Injectable 25 Gram(s) IV Push once  dextrose 50% Injectable 25 Gram(s) IV Push once  insulin glargine Injectable (LANTUS) 30 Unit(s) SubCutaneous at bedtime  insulin lispro (HumaLOG) corrective regimen sliding scale   SubCutaneous three times a day before meals  isosorbide   mononitrate ER Tablet (IMDUR) 60 milliGRAM(s) Oral daily  memantine 5 milliGRAM(s) Oral daily  QUEtiapine 200 milliGRAM(s) Oral at bedtime  rivaroxaban 15 milliGRAM(s) Oral two times a day with meals  spironolactone 25 milliGRAM(s) Oral daily    MEDICATIONS  (PRN):  dextrose 40% Gel 15 Gram(s) Oral once PRN Blood Glucose LESS THAN 70 milliGRAM(s)/deciliter  glucagon  Injectable 1 milliGRAM(s) IntraMuscular once PRN Glucose LESS THAN 70 milligrams/deciliter

## 2020-07-30 LAB
ANION GAP SERPL CALC-SCNC: 5 MMOL/L — SIGNIFICANT CHANGE UP (ref 5–17)
BUN SERPL-MCNC: 18 MG/DL — SIGNIFICANT CHANGE UP (ref 7–23)
CALCIUM SERPL-MCNC: 8.3 MG/DL — LOW (ref 8.5–10.1)
CHLORIDE SERPL-SCNC: 106 MMOL/L — SIGNIFICANT CHANGE UP (ref 96–108)
CO2 SERPL-SCNC: 29 MMOL/L — SIGNIFICANT CHANGE UP (ref 22–31)
CREAT SERPL-MCNC: 0.88 MG/DL — SIGNIFICANT CHANGE UP (ref 0.5–1.3)
CULTURE RESULTS: SIGNIFICANT CHANGE UP
CULTURE RESULTS: SIGNIFICANT CHANGE UP
GLUCOSE BLDC GLUCOMTR-MCNC: 191 MG/DL — HIGH (ref 70–99)
GLUCOSE BLDC GLUCOMTR-MCNC: 223 MG/DL — HIGH (ref 70–99)
GLUCOSE BLDC GLUCOMTR-MCNC: 243 MG/DL — HIGH (ref 70–99)
GLUCOSE BLDC GLUCOMTR-MCNC: 83 MG/DL — SIGNIFICANT CHANGE UP (ref 70–99)
GLUCOSE SERPL-MCNC: 185 MG/DL — HIGH (ref 70–99)
HCT VFR BLD CALC: 41.9 % — SIGNIFICANT CHANGE UP (ref 39–50)
HGB BLD-MCNC: 13 G/DL — SIGNIFICANT CHANGE UP (ref 13–17)
MAGNESIUM SERPL-MCNC: 2.1 MG/DL — SIGNIFICANT CHANGE UP (ref 1.6–2.6)
MCHC RBC-ENTMCNC: 27.8 PG — SIGNIFICANT CHANGE UP (ref 27–34)
MCHC RBC-ENTMCNC: 31 GM/DL — LOW (ref 32–36)
MCV RBC AUTO: 89.7 FL — SIGNIFICANT CHANGE UP (ref 80–100)
NRBC # BLD: 0 /100 WBCS — SIGNIFICANT CHANGE UP (ref 0–0)
PHOSPHATE SERPL-MCNC: 4 MG/DL — SIGNIFICANT CHANGE UP (ref 2.5–4.5)
PLATELET # BLD AUTO: 212 K/UL — SIGNIFICANT CHANGE UP (ref 150–400)
POTASSIUM SERPL-MCNC: 3.9 MMOL/L — SIGNIFICANT CHANGE UP (ref 3.5–5.3)
POTASSIUM SERPL-SCNC: 3.9 MMOL/L — SIGNIFICANT CHANGE UP (ref 3.5–5.3)
RBC # BLD: 4.67 M/UL — SIGNIFICANT CHANGE UP (ref 4.2–5.8)
RBC # FLD: 16.4 % — HIGH (ref 10.3–14.5)
SODIUM SERPL-SCNC: 140 MMOL/L — SIGNIFICANT CHANGE UP (ref 135–145)
SPECIMEN SOURCE: SIGNIFICANT CHANGE UP
SPECIMEN SOURCE: SIGNIFICANT CHANGE UP
WBC # BLD: 5.29 K/UL — SIGNIFICANT CHANGE UP (ref 3.8–10.5)
WBC # FLD AUTO: 5.29 K/UL — SIGNIFICANT CHANGE UP (ref 3.8–10.5)

## 2020-07-30 PROCEDURE — 99233 SBSQ HOSP IP/OBS HIGH 50: CPT

## 2020-07-30 RX ORDER — INSULIN LISPRO 100/ML
5 VIAL (ML) SUBCUTANEOUS
Refills: 0 | Status: DISCONTINUED | OUTPATIENT
Start: 2020-07-30 | End: 2020-08-04

## 2020-07-30 RX ADMIN — Medication 5 UNIT(S): at 17:24

## 2020-07-30 RX ADMIN — Medication 2: at 08:53

## 2020-07-30 RX ADMIN — CARVEDILOL PHOSPHATE 12.5 MILLIGRAM(S): 80 CAPSULE, EXTENDED RELEASE ORAL at 05:25

## 2020-07-30 RX ADMIN — MEMANTINE HYDROCHLORIDE 5 MILLIGRAM(S): 10 TABLET ORAL at 11:47

## 2020-07-30 RX ADMIN — Medication 2: at 11:45

## 2020-07-30 RX ADMIN — SPIRONOLACTONE 25 MILLIGRAM(S): 25 TABLET, FILM COATED ORAL at 05:25

## 2020-07-30 RX ADMIN — ATORVASTATIN CALCIUM 40 MILLIGRAM(S): 80 TABLET, FILM COATED ORAL at 21:15

## 2020-07-30 RX ADMIN — Medication 5 UNIT(S): at 11:44

## 2020-07-30 RX ADMIN — INSULIN GLARGINE 30 UNIT(S): 100 INJECTION, SOLUTION SUBCUTANEOUS at 21:15

## 2020-07-30 RX ADMIN — RIVAROXABAN 15 MILLIGRAM(S): KIT at 08:52

## 2020-07-30 RX ADMIN — QUETIAPINE FUMARATE 200 MILLIGRAM(S): 200 TABLET, FILM COATED ORAL at 21:15

## 2020-07-30 RX ADMIN — RIVAROXABAN 15 MILLIGRAM(S): KIT at 17:24

## 2020-07-30 RX ADMIN — CARVEDILOL PHOSPHATE 12.5 MILLIGRAM(S): 80 CAPSULE, EXTENDED RELEASE ORAL at 17:24

## 2020-07-30 RX ADMIN — ISOSORBIDE MONONITRATE 60 MILLIGRAM(S): 60 TABLET, EXTENDED RELEASE ORAL at 11:43

## 2020-07-30 RX ADMIN — Medication 162 MILLIGRAM(S): at 11:44

## 2020-07-30 NOTE — PROGRESS NOTE ADULT - SUBJECTIVE AND OBJECTIVE BOX
75F with a suspected PMH of CHF s/p PPM, HTN, DM, dementia who was brought to the ED per AMS.  Patient primarily speaks [place] Creole.  Patient currently AAOx1, unable to provide history.  Per ED attending, patient was noted to not be at his baseline.  Reportedly patient was agitated, confused, walking around his house naked.  Reportedly had COVID19 in April.  When EMS arrived, he was found to be in Afib with .  Given diltiazem in the field.  Patient's apartment reportedly extremely hot and patient found extremely diaphoretic.  Vitals stable in ED.  Labs benign.  Preliminary CT findings - L sided pulmonary embolism and thrombus within the L atrium.  Will admit to tele. (2020 03:36)      Chief Complaint:  Patient is a 75y old  Male who presents with a chief complaint of AMS, Afib (2020 09:51)      Review of Systems:    General:  No wt loss, fevers, chills, night sweats  Eyes:  Good vision, no reported pain  ENT:  No sore throat, pain, runny nose, dysphagia  CV:  No pain, palpitations, hypo/hypertension  Resp:  No dyspnea, cough, tachypnea, wheezing  GI:  No pain, nausea, vomiting, diarrhea, constipation  :  No pain, bleeding, incontinence, nocturia           Social History/Family History  SOCHX:   tobacco,  -  alcohol    FMHX: FA/MO  - contributory       Discussed with:  PMD, Family    Physical Exam:    Vital Signs:  Vital Signs Last 24 Hrs  T(C): 36.7 (2020 16:48), Max: 37.3 (2020 01:23)  T(F): 98.1 (2020 16:48), Max: 99.1 (2020 01:23)  HR: 96 (2020 16:48) (85 - 96)  BP: 115/70 (2020 16:48) (105/48 - 131/70)  BP(mean): --  RR: 18 (2020 16:48) (17 - 22)  SpO2: 99% (2020 16:48) (94% - 99%)  Daily Height in cm: 167.64 (2020 16:48)    Daily   I&O's Summary    2020 07:01  -  2020 07:00  --------------------------------------------------------  IN: 0 mL / OUT: 650 mL / NET: -650 mL  2020 07:01  -  2020 21:36  --------------------------------------------------------  IN: 720 mL / OUT: 0 mL / NET: 720 mL          Chest:  Full & symmetric excursion, no increased effort, breath sounds clear  Cardiovascular:  Regular rhythm, S1, S2, no murmur/rub/S3/S4, no carotid/femoral/abdominal bruit, radial/pedal pulses 2+,   Abdomen:  Soft, non-tender, non-distended, normoactive bowel sounds, no HSM      Laboratory:                          13.4   4.62  )-----------( 181      ( 2020 10:32 )             42.4     07-25    142  |  110<H>  |  15  ----------------------------<  215<H>  4.0   |  26  |  1.15    Ca    8.4<L>      2020 00:10    TPro  6.0  /  Alb  2.4<L>  /  TBili  0.4  /  DBili  x   /  AST  36  /  ALT  33  /  AlkPhos  49  07-25      CARDIAC MARKERS ( 2020 00:10 )  .031 ng/mL / x     / x     / x     / x          CAPILLARY BLOOD GLUCOSE      POCT Blood Glucose.: 186 mg/dL (2020 21:23)  POCT Blood Glucose.: 203 mg/dL (2020 16:45)  POCT Blood Glucose.: 74 mg/dL (2020 11:10)  POCT Blood Glucose.: 107 mg/dL (2020 08:00)  POCT Blood Glucose.: 129 mg/dL (2020 05:58)    LIVER FUNCTIONS - ( 2020 00:10 )  Alb: 2.4 g/dL / Pro: 6.0 gm/dL / ALK PHOS: 49 U/L / ALT: 33 U/L / AST: 36 U/L / GGT: x           PT/INR - ( 2020 00:10 )   PT: 14.3 sec;   INR: 1.30 ratio         PTT - ( 2020 17:39 )  PTT:50.4 sec  Urinalysis Basic - ( 2020 04:10 )    Color: Yellow / Appearance: Clear / S.010 / pH: x  Gluc: x / Ketone: Negative  / Bili: Negative / Urobili: Negative mg/dL   Blood: x / Protein: 15 mg/dL / Nitrite: Negative   Leuk Esterase: Negative / RBC: 3-5 /HPF / WBC 0-2   Sq Epi: x / Non Sq Epi: Occasional / Bacteria: Negative      Assessment:  the patient's primary diagnosis was altered mental status  Upon review of the chart due to the patient not giving an adequate history through an  they appear confused  the CAT scan angiogram reveals a pulmonary embolus   Treatment for both of these issues remains the same with heparin intravenous continuing, to AC PO  A diagnostic echocardiogram is completed, no clot noted  Continue AC, no AGUSTÍN needed  F/U out patient echo in 4 weeks  Cardiac enzymes remain normal, BNP level is low  EKG is noted And vital signs remained stable on current regimen

## 2020-07-30 NOTE — PROGRESS NOTE ADULT - SUBJECTIVE AND OBJECTIVE BOX
CHIEF COMPLAINT: no bradycardia in 30's last night per nurse.   no report of any vomiting, fever or diarrhea        PHYSICAL EXAM:    GENERAL: Moderately built,   CHEST/LUNG: Clear to ausculation bilaterally, no wheezing, no crackles   HEART: Irregularly irregular,   ABDOMEN: Soft, Nontender, Nondistended; Bowel sounds present  EXTREMITIES:  Moving all four extremities spontaneously, No clubbing, cyanosis, or edema  NERVOUS SYSTEM:  Grossly non focal.  Psychiatry: AAO x name only, followed most simple commands.       OBJECTIVE DATA:     Vital Signs Last 24 Hrs  T(C): 36.3 (2020 12:29), Max: 37.1 (2020 16:55)  T(F): 97.4 (2020 12:29), Max: 98.8 (2020 16:55)  HR: 93 (2020 12:29) (72 - 93)  BP: 143/78 (2020 12:29) (115/98 - 145/70)  BP(mean): --  RR: 18 (2020 12:29) (18 - 18)  SpO2: 96% (2020 12:29) (92% - 98%)           Daily     Daily Weight in k.4 (2020 05:00)  LABS:                        13.0   5.29  )-----------( 212      ( 2020 04:15 )             41.9             07-30    140  |  106  |  18  ----------------------------<  185<H>  3.9   |  29  |  0.88    Ca    8.3<L>      2020 04:15  Phos  4.0     07-30  Mg     2.1     07-30                PTT - ( 2020 14:02 )  PTT:88.1 sec         CAPILLARY BLOOD GLUCOSE      POCT Blood Glucose.: 243 mg/dL (2020 11:25)    Tele reviewed by me showed episodic NSVT	    MEDICATIONS  (STANDING):  aspirin  chewable 162 milliGRAM(s) Oral daily  atorvastatin 40 milliGRAM(s) Oral at bedtime  carvedilol 12.5 milliGRAM(s) Oral every 12 hours  dextrose 5%. 1000 milliLiter(s) (50 mL/Hr) IV Continuous <Continuous>  dextrose 50% Injectable 12.5 Gram(s) IV Push once  dextrose 50% Injectable 25 Gram(s) IV Push once  dextrose 50% Injectable 25 Gram(s) IV Push once  insulin glargine Injectable (LANTUS) 30 Unit(s) SubCutaneous at bedtime  insulin lispro (HumaLOG) corrective regimen sliding scale   SubCutaneous three times a day before meals  insulin lispro Injectable (HumaLOG) 5 Unit(s) SubCutaneous three times a day before meals  isosorbide   mononitrate ER Tablet (IMDUR) 60 milliGRAM(s) Oral daily  memantine 5 milliGRAM(s) Oral daily  QUEtiapine 200 milliGRAM(s) Oral at bedtime  rivaroxaban 15 milliGRAM(s) Oral two times a day with meals  spironolactone 25 milliGRAM(s) Oral daily    MEDICATIONS  (PRN):  dextrose 40% Gel 15 Gram(s) Oral once PRN Blood Glucose LESS THAN 70 milliGRAM(s)/deciliter  glucagon  Injectable 1 milliGRAM(s) IntraMuscular once PRN Glucose LESS THAN 70 milligrams/deciliter

## 2020-07-31 LAB
ANION GAP SERPL CALC-SCNC: 4 MMOL/L — LOW (ref 5–17)
BUN SERPL-MCNC: 15 MG/DL — SIGNIFICANT CHANGE UP (ref 7–23)
CALCIUM SERPL-MCNC: 8.2 MG/DL — LOW (ref 8.5–10.1)
CHLORIDE SERPL-SCNC: 109 MMOL/L — HIGH (ref 96–108)
CO2 SERPL-SCNC: 31 MMOL/L — SIGNIFICANT CHANGE UP (ref 22–31)
CREAT SERPL-MCNC: 0.82 MG/DL — SIGNIFICANT CHANGE UP (ref 0.5–1.3)
GLUCOSE BLDC GLUCOMTR-MCNC: 112 MG/DL — HIGH (ref 70–99)
GLUCOSE BLDC GLUCOMTR-MCNC: 128 MG/DL — HIGH (ref 70–99)
GLUCOSE BLDC GLUCOMTR-MCNC: 150 MG/DL — HIGH (ref 70–99)
GLUCOSE BLDC GLUCOMTR-MCNC: 160 MG/DL — HIGH (ref 70–99)
GLUCOSE SERPL-MCNC: 125 MG/DL — HIGH (ref 70–99)
HCT VFR BLD CALC: 42.5 % — SIGNIFICANT CHANGE UP (ref 39–50)
HGB BLD-MCNC: 12.8 G/DL — LOW (ref 13–17)
MAGNESIUM SERPL-MCNC: 1.8 MG/DL — SIGNIFICANT CHANGE UP (ref 1.6–2.6)
MCHC RBC-ENTMCNC: 27.9 PG — SIGNIFICANT CHANGE UP (ref 27–34)
MCHC RBC-ENTMCNC: 30.1 GM/DL — LOW (ref 32–36)
MCV RBC AUTO: 92.6 FL — SIGNIFICANT CHANGE UP (ref 80–100)
NRBC # BLD: 0 /100 WBCS — SIGNIFICANT CHANGE UP (ref 0–0)
PHOSPHATE SERPL-MCNC: 4.9 MG/DL — HIGH (ref 2.5–4.5)
PLATELET # BLD AUTO: 216 K/UL — SIGNIFICANT CHANGE UP (ref 150–400)
POTASSIUM SERPL-MCNC: 3.8 MMOL/L — SIGNIFICANT CHANGE UP (ref 3.5–5.3)
POTASSIUM SERPL-SCNC: 3.8 MMOL/L — SIGNIFICANT CHANGE UP (ref 3.5–5.3)
RBC # BLD: 4.59 M/UL — SIGNIFICANT CHANGE UP (ref 4.2–5.8)
RBC # FLD: 16.4 % — HIGH (ref 10.3–14.5)
SODIUM SERPL-SCNC: 144 MMOL/L — SIGNIFICANT CHANGE UP (ref 135–145)
WBC # BLD: 5.83 K/UL — SIGNIFICANT CHANGE UP (ref 3.8–10.5)
WBC # FLD AUTO: 5.83 K/UL — SIGNIFICANT CHANGE UP (ref 3.8–10.5)

## 2020-07-31 PROCEDURE — 99232 SBSQ HOSP IP/OBS MODERATE 35: CPT

## 2020-07-31 PROCEDURE — 70450 CT HEAD/BRAIN W/O DYE: CPT | Mod: 26

## 2020-07-31 RX ORDER — SPIRONOLACTONE 25 MG/1
1 TABLET, FILM COATED ORAL
Qty: 30 | Refills: 0
Start: 2020-07-31

## 2020-07-31 RX ORDER — ASPIRIN/CALCIUM CARB/MAGNESIUM 324 MG
2 TABLET ORAL
Qty: 60 | Refills: 0
Start: 2020-07-31

## 2020-07-31 RX ORDER — ENOXAPARIN SODIUM 100 MG/ML
30 INJECTION SUBCUTANEOUS
Qty: 9 | Refills: 0
Start: 2020-07-31

## 2020-07-31 RX ORDER — ISOSORBIDE MONONITRATE 60 MG/1
1 TABLET, EXTENDED RELEASE ORAL
Qty: 30 | Refills: 0
Start: 2020-07-31

## 2020-07-31 RX ORDER — MEMANTINE HYDROCHLORIDE 10 MG/1
1 TABLET ORAL
Qty: 30 | Refills: 0
Start: 2020-07-31

## 2020-07-31 RX ORDER — QUETIAPINE FUMARATE 200 MG/1
1 TABLET, FILM COATED ORAL
Qty: 30 | Refills: 0
Start: 2020-07-31

## 2020-07-31 RX ORDER — RIVAROXABAN 15 MG-20MG
1 KIT ORAL
Qty: 30 | Refills: 0
Start: 2020-07-31

## 2020-07-31 RX ORDER — CARVEDILOL PHOSPHATE 80 MG/1
1 CAPSULE, EXTENDED RELEASE ORAL
Qty: 60 | Refills: 0
Start: 2020-07-31

## 2020-07-31 RX ORDER — ATORVASTATIN CALCIUM 80 MG/1
1 TABLET, FILM COATED ORAL
Qty: 30 | Refills: 0
Start: 2020-07-31

## 2020-07-31 RX ADMIN — Medication 5 UNIT(S): at 11:38

## 2020-07-31 RX ADMIN — Medication 5 UNIT(S): at 17:25

## 2020-07-31 RX ADMIN — QUETIAPINE FUMARATE 200 MILLIGRAM(S): 200 TABLET, FILM COATED ORAL at 22:10

## 2020-07-31 RX ADMIN — RIVAROXABAN 15 MILLIGRAM(S): KIT at 17:25

## 2020-07-31 RX ADMIN — MEMANTINE HYDROCHLORIDE 5 MILLIGRAM(S): 10 TABLET ORAL at 11:38

## 2020-07-31 RX ADMIN — ISOSORBIDE MONONITRATE 60 MILLIGRAM(S): 60 TABLET, EXTENDED RELEASE ORAL at 11:38

## 2020-07-31 RX ADMIN — CARVEDILOL PHOSPHATE 12.5 MILLIGRAM(S): 80 CAPSULE, EXTENDED RELEASE ORAL at 17:25

## 2020-07-31 RX ADMIN — RIVAROXABAN 15 MILLIGRAM(S): KIT at 08:00

## 2020-07-31 RX ADMIN — ATORVASTATIN CALCIUM 40 MILLIGRAM(S): 80 TABLET, FILM COATED ORAL at 22:09

## 2020-07-31 RX ADMIN — Medication 5 UNIT(S): at 08:00

## 2020-07-31 RX ADMIN — CARVEDILOL PHOSPHATE 12.5 MILLIGRAM(S): 80 CAPSULE, EXTENDED RELEASE ORAL at 05:36

## 2020-07-31 RX ADMIN — INSULIN GLARGINE 30 UNIT(S): 100 INJECTION, SOLUTION SUBCUTANEOUS at 22:49

## 2020-07-31 RX ADMIN — Medication 162 MILLIGRAM(S): at 11:38

## 2020-07-31 RX ADMIN — SPIRONOLACTONE 25 MILLIGRAM(S): 25 TABLET, FILM COATED ORAL at 05:36

## 2020-07-31 NOTE — PROGRESS NOTE ADULT - SUBJECTIVE AND OBJECTIVE BOX
75F with a suspected PMH of CHF s/p PPM, HTN, DM, dementia who was brought to the ED per AMS.  Patient primarily speaks [place] Creole.  Patient currently AAOx1, unable to provide history.  Per ED attending, patient was noted to not be at his baseline.  Reportedly patient was agitated, confused, walking around his house naked.  Reportedly had COVID19 in April.  When EMS arrived, he was found to be in Afib with .  Given diltiazem in the field.  Patient's apartment reportedly extremely hot and patient found extremely diaphoretic.  Vitals stable in ED.  Labs benign.  Preliminary CT findings - L sided pulmonary embolism and thrombus within the L atrium.  Will admit to tele. (2020 03:36)      Chief Complaint:  Patient is a 75y old  Male who presents with a chief complaint of AMS, Afib (2020 09:51)      Review of Systems:    General:  No wt loss, fevers, chills, night sweats  Eyes:  Good vision, no reported pain  ENT:  No sore throat, pain, runny nose, dysphagia  CV:  No pain, palpitations, hypo/hypertension  Resp:  No dyspnea, cough, tachypnea, wheezing  GI:  No pain, nausea, vomiting, diarrhea, constipation  :  No pain, bleeding, incontinence, nocturia           Social History/Family History  SOCHX:   tobacco,  -  alcohol    FMHX: FA/MO  - contributory       Discussed with:  PMD, Family    Physical Exam:    Vital Signs:  Vital Signs Last 24 Hrs  T(C): 36.7 (2020 16:48), Max: 37.3 (2020 01:23)  T(F): 98.1 (2020 16:48), Max: 99.1 (2020 01:23)  HR: 96 (2020 16:48) (85 - 96)  BP: 115/70 (2020 16:48) (105/48 - 131/70)  BP(mean): --  RR: 18 (2020 16:48) (17 - 22)  SpO2: 99% (2020 16:48) (94% - 99%)  Daily Height in cm: 167.64 (2020 16:48)    Daily   I&O's Summary    2020 07:01  -  2020 07:00  --------------------------------------------------------  IN: 0 mL / OUT: 650 mL / NET: -650 mL  2020 07:01  -  2020 21:36  --------------------------------------------------------  IN: 720 mL / OUT: 0 mL / NET: 720 mL          Chest:  Full & symmetric excursion, no increased effort, breath sounds clear  Cardiovascular:  Regular rhythm, S1, S2, no murmur/rub/S3/S4, no carotid/femoral/abdominal bruit, radial/pedal pulses 2+,   Abdomen:  Soft, non-tender, non-distended, normoactive bowel sounds, no HSM      Laboratory:                          13.4   4.62  )-----------( 181      ( 2020 10:32 )             42.4     07-25    142  |  110<H>  |  15  ----------------------------<  215<H>  4.0   |  26  |  1.15    Ca    8.4<L>      2020 00:10    TPro  6.0  /  Alb  2.4<L>  /  TBili  0.4  /  DBili  x   /  AST  36  /  ALT  33  /  AlkPhos  49  07-25      CARDIAC MARKERS ( 2020 00:10 )  .031 ng/mL / x     / x     / x     / x          CAPILLARY BLOOD GLUCOSE      POCT Blood Glucose.: 186 mg/dL (2020 21:23)  POCT Blood Glucose.: 203 mg/dL (2020 16:45)  POCT Blood Glucose.: 74 mg/dL (2020 11:10)  POCT Blood Glucose.: 107 mg/dL (2020 08:00)  POCT Blood Glucose.: 129 mg/dL (2020 05:58)    LIVER FUNCTIONS - ( 2020 00:10 )  Alb: 2.4 g/dL / Pro: 6.0 gm/dL / ALK PHOS: 49 U/L / ALT: 33 U/L / AST: 36 U/L / GGT: x           PT/INR - ( 2020 00:10 )   PT: 14.3 sec;   INR: 1.30 ratio         PTT - ( 2020 17:39 )  PTT:50.4 sec  Urinalysis Basic - ( 2020 04:10 )    Color: Yellow / Appearance: Clear / S.010 / pH: x  Gluc: x / Ketone: Negative  / Bili: Negative / Urobili: Negative mg/dL   Blood: x / Protein: 15 mg/dL / Nitrite: Negative   Leuk Esterase: Negative / RBC: 3-5 /HPF / WBC 0-2   Sq Epi: x / Non Sq Epi: Occasional / Bacteria: Negative      Assessment:  the patient's primary diagnosis was altered mental status  Upon review of the chart due to the patient not giving an adequate history through an  they appear confused  the CAT scan angiogram reveals a pulmonary embolus   Treatment for both of these issues remains the same with heparin intravenous continuing, to AC PO  A diagnostic echocardiogram is completed, no clot noted  Continue AC, no AGUSTÍN needed  F/U out patient echo in 4 weeks  Cardiac enzymes remain normal, BNP level is low  EKG is noted And vital signs remained stable on current regimen

## 2020-07-31 NOTE — PROGRESS NOTE ADULT - SUBJECTIVE AND OBJECTIVE BOX
CHIEF COMPLAINT: no change in mental status in last 24 hours.   no fever no cough  no n/v/d/abd pain   no new focal weakness        PHYSICAL EXAM:    GENERAL: Moderately built,   CHEST/LUNG: Clear to ausculation bilaterally, no wheezing, no crackles   HEART: Irregularly irregular,   ABDOMEN: Soft, Nontender, Nondistended; Bowel sounds present  EXTREMITIES:  Moving all four extremities spontaneously, No clubbing, cyanosis, or edema  NERVOUS SYSTEM:  Grossly non focal.  Psychiatry: AAO x name only, followed most simple commands.       OBJECTIVE DATA:     Vital Signs Last 24 Hrs  T(C): 36.3 (2020 11:23), Max: 37 (2020 05:21)  T(F): 97.3 (2020 11:23), Max: 98.6 (2020 05:21)  HR: 91 (2020 11:23) (79 - 91)  BP: 112/70 (2020 11:23) (112/58 - 138/77)  BP(mean): --  RR: 18 (2020 11:23) (17 - 18)  SpO2: 92% (2020 11:23) (92% - 98%)           Daily     Daily Weight in k.9 (2020 05:21)  LABS:                        12.8   5.83  )-----------( 216      ( 2020 08:16 )             42.5             07-31    144  |  109<H>  |  15  ----------------------------<  125<H>  3.8   |  31  |  0.82    Ca    8.2<L>      2020 08:16  Phos  4.9     07-31  Mg     1.8     07-31                         CAPILLARY BLOOD GLUCOSE      POCT Blood Glucose.: 150 mg/dL (2020 11:29)          Interval Radiology studies: reviewed     < from: CT Head No Cont (20 @ 09:49) >  IMPRESSION:    No CT evidence of acute hemorrhage or acute territorial infarction. There is no significant interval change.        MEDICATIONS  (STANDING):  aspirin  chewable 162 milliGRAM(s) Oral daily  atorvastatin 40 milliGRAM(s) Oral at bedtime  carvedilol 12.5 milliGRAM(s) Oral every 12 hours  dextrose 5%. 1000 milliLiter(s) (50 mL/Hr) IV Continuous <Continuous>  dextrose 50% Injectable 12.5 Gram(s) IV Push once  dextrose 50% Injectable 25 Gram(s) IV Push once  dextrose 50% Injectable 25 Gram(s) IV Push once  insulin glargine Injectable (LANTUS) 30 Unit(s) SubCutaneous at bedtime  insulin lispro (HumaLOG) corrective regimen sliding scale   SubCutaneous three times a day before meals  insulin lispro Injectable (HumaLOG) 5 Unit(s) SubCutaneous three times a day before meals  isosorbide   mononitrate ER Tablet (IMDUR) 60 milliGRAM(s) Oral daily  memantine 5 milliGRAM(s) Oral daily  QUEtiapine 200 milliGRAM(s) Oral at bedtime  rivaroxaban 15 milliGRAM(s) Oral two times a day with meals  spironolactone 25 milliGRAM(s) Oral daily    MEDICATIONS  (PRN):  dextrose 40% Gel 15 Gram(s) Oral once PRN Blood Glucose LESS THAN 70 milliGRAM(s)/deciliter  glucagon  Injectable 1 milliGRAM(s) IntraMuscular once PRN Glucose LESS THAN 70 milligrams/deciliter

## 2020-08-01 LAB
GLUCOSE BLDC GLUCOMTR-MCNC: 122 MG/DL — HIGH (ref 70–99)
GLUCOSE BLDC GLUCOMTR-MCNC: 148 MG/DL — HIGH (ref 70–99)
GLUCOSE BLDC GLUCOMTR-MCNC: 156 MG/DL — HIGH (ref 70–99)
GLUCOSE BLDC GLUCOMTR-MCNC: 186 MG/DL — HIGH (ref 70–99)
HCT VFR BLD CALC: 40.7 % — SIGNIFICANT CHANGE UP (ref 39–50)
HGB BLD-MCNC: 12.8 G/DL — LOW (ref 13–17)
MCHC RBC-ENTMCNC: 28.2 PG — SIGNIFICANT CHANGE UP (ref 27–34)
MCHC RBC-ENTMCNC: 31.4 GM/DL — LOW (ref 32–36)
MCV RBC AUTO: 89.6 FL — SIGNIFICANT CHANGE UP (ref 80–100)
NRBC # BLD: 0 /100 WBCS — SIGNIFICANT CHANGE UP (ref 0–0)
PLATELET # BLD AUTO: 205 K/UL — SIGNIFICANT CHANGE UP (ref 150–400)
RBC # BLD: 4.54 M/UL — SIGNIFICANT CHANGE UP (ref 4.2–5.8)
RBC # FLD: 16.4 % — HIGH (ref 10.3–14.5)
WBC # BLD: 4.19 K/UL — SIGNIFICANT CHANGE UP (ref 3.8–10.5)
WBC # FLD AUTO: 4.19 K/UL — SIGNIFICANT CHANGE UP (ref 3.8–10.5)

## 2020-08-01 PROCEDURE — 99232 SBSQ HOSP IP/OBS MODERATE 35: CPT

## 2020-08-01 RX ADMIN — RIVAROXABAN 15 MILLIGRAM(S): KIT at 07:59

## 2020-08-01 RX ADMIN — Medication 5 UNIT(S): at 16:49

## 2020-08-01 RX ADMIN — RIVAROXABAN 15 MILLIGRAM(S): KIT at 17:02

## 2020-08-01 RX ADMIN — Medication 162 MILLIGRAM(S): at 11:47

## 2020-08-01 RX ADMIN — INSULIN GLARGINE 30 UNIT(S): 100 INJECTION, SOLUTION SUBCUTANEOUS at 21:29

## 2020-08-01 RX ADMIN — CARVEDILOL PHOSPHATE 12.5 MILLIGRAM(S): 80 CAPSULE, EXTENDED RELEASE ORAL at 06:43

## 2020-08-01 RX ADMIN — CARVEDILOL PHOSPHATE 12.5 MILLIGRAM(S): 80 CAPSULE, EXTENDED RELEASE ORAL at 17:02

## 2020-08-01 RX ADMIN — ISOSORBIDE MONONITRATE 60 MILLIGRAM(S): 60 TABLET, EXTENDED RELEASE ORAL at 11:47

## 2020-08-01 RX ADMIN — Medication 1: at 16:49

## 2020-08-01 RX ADMIN — SPIRONOLACTONE 25 MILLIGRAM(S): 25 TABLET, FILM COATED ORAL at 06:43

## 2020-08-01 RX ADMIN — QUETIAPINE FUMARATE 200 MILLIGRAM(S): 200 TABLET, FILM COATED ORAL at 21:23

## 2020-08-01 RX ADMIN — MEMANTINE HYDROCHLORIDE 5 MILLIGRAM(S): 10 TABLET ORAL at 11:47

## 2020-08-01 RX ADMIN — Medication 5 UNIT(S): at 11:48

## 2020-08-01 RX ADMIN — Medication 1: at 11:48

## 2020-08-01 RX ADMIN — Medication 5 UNIT(S): at 07:59

## 2020-08-01 RX ADMIN — ATORVASTATIN CALCIUM 40 MILLIGRAM(S): 80 TABLET, FILM COATED ORAL at 21:23

## 2020-08-01 NOTE — PROGRESS NOTE ADULT - SUBJECTIVE AND OBJECTIVE BOX
75F with a suspected PMH of CHF s/p PPM, HTN, DM, dementia who was brought to the ED per AMS.  Patient primarily speaks [place] of [place] Creole.  Patient currently AAOx1, unable to provide history.  Per ED attending, patient was noted to not be at his baseline.  Reportedly patient was agitated, confused, walking around his house naked.  Reportedly had COVID19 in April.  When EMS arrived, he was found to be in Afib with .  Given diltiazem in the field.  Patient's apartment reportedly extremely hot and patient found extremely diaphoretic.  Vitals stable in ED.  Labs benign.  Preliminary CT findings - L sided pulmonary embolism and thrombus within the L atrium.  Will admit to tele. (2020 03:36)      Chief Complaint:  Patient is a 75y old  Male who presents with a chief complaint of AMS, Afib (2020 09:51)      Review of Systems:    General:  No wt loss, fevers, chills, night sweats  Eyes:  Good vision, no reported pain  ENT:  No sore throat, pain, runny nose, dysphagia  CV:  No pain, palpitations, hypo/hypertension  Resp:  No dyspnea, cough, tachypnea, wheezing  GI:  No pain, nausea, vomiting, diarrhea, constipation  :  No pain, bleeding, incontinence, nocturia           Social History/Family History  SOCHX:   tobacco,  -  alcohol    FMHX: FA/MO  - contributory       Discussed with:  PMD, Family    Physical Exam:    Vital Signs:  Vital Signs Last 24 Hrs  T(C): 36.7 (2020 16:48), Max: 37.3 (2020 01:23)  T(F): 98.1 (2020 16:48), Max: 99.1 (2020 01:23)  HR: 96 (2020 16:48) (85 - 96)  BP: 115/70 (2020 16:48) (105/48 - 131/70)  BP(mean): --  RR: 18 (2020 16:48) (17 - 22)  SpO2: 99% (2020 16:48) (94% - 99%)  Daily Height in cm: 167.64 (2020 16:48)    Daily   I&O's Summary    2020 07:01  -  2020 07:00  --------------------------------------------------------  IN: 0 mL / OUT: 650 mL / NET: -650 mL  2020 07:01  -  2020 21:36  --------------------------------------------------------  IN: 720 mL / OUT: 0 mL / NET: 720 mL          Chest:  Full & symmetric excursion, no increased effort, breath sounds clear  Cardiovascular:  Regular rhythm, S1, S2, no murmur/rub/S3/S4, no carotid/femoral/abdominal bruit, radial/pedal pulses 2+,   Abdomen:  Soft, non-tender, non-distended, normoactive bowel sounds, no HSM      Laboratory:                          13.4   4.62  )-----------( 181      ( 2020 10:32 )             42.4     07-25    142  |  110<H>  |  15  ----------------------------<  215<H>  4.0   |  26  |  1.15    Ca    8.4<L>      2020 00:10    TPro  6.0  /  Alb  2.4<L>  /  TBili  0.4  /  DBili  x   /  AST  36  /  ALT  33  /  AlkPhos  49  07-25      CARDIAC MARKERS ( 2020 00:10 )  .031 ng/mL / x     / x     / x     / x          CAPILLARY BLOOD GLUCOSE      POCT Blood Glucose.: 186 mg/dL (2020 21:23)  POCT Blood Glucose.: 203 mg/dL (2020 16:45)  POCT Blood Glucose.: 74 mg/dL (2020 11:10)  POCT Blood Glucose.: 107 mg/dL (2020 08:00)  POCT Blood Glucose.: 129 mg/dL (2020 05:58)    LIVER FUNCTIONS - ( 2020 00:10 )  Alb: 2.4 g/dL / Pro: 6.0 gm/dL / ALK PHOS: 49 U/L / ALT: 33 U/L / AST: 36 U/L / GGT: x           PT/INR - ( 2020 00:10 )   PT: 14.3 sec;   INR: 1.30 ratio         PTT - ( 2020 17:39 )  PTT:50.4 sec  Urinalysis Basic - ( 2020 04:10 )    Color: Yellow / Appearance: Clear / S.010 / pH: x  Gluc: x / Ketone: Negative  / Bili: Negative / Urobili: Negative mg/dL   Blood: x / Protein: 15 mg/dL / Nitrite: Negative   Leuk Esterase: Negative / RBC: 3-5 /HPF / WBC 0-2   Sq Epi: x / Non Sq Epi: Occasional / Bacteria: Negative      Assessment:  the patient's primary diagnosis was altered mental status  Upon review of the chart due to the patient not giving an adequate history through an  they appear confused  the CAT scan angiogram reveals a pulmonary embolus   Treatment for both of these issues remains the same with heparin intravenous continuing, to AC PO  A diagnostic echocardiogram is completed, no clot noted  Continue AC, no AGUSTÍN needed  F/U out patient echo in 4 weeks  Cardiac enzymes remain normal, BNP level is low  EKG is noted And vital signs remained stable on current regimen  Stable for DC

## 2020-08-01 NOTE — PROGRESS NOTE ADULT - SUBJECTIVE AND OBJECTIVE BOX
CHIEF COMPLAINT: Baseline mental status  no fever  no vomiting  no chest pain  no shortness of breath         PHYSICAL EXAM:    GENERAL: Moderately built,   CHEST/LUNG: Clear to ausculation bilaterally, no wheezing, no crackles   HEART: Irregularly irregular,   ABDOMEN: Soft, Nontender, Nondistended; Bowel sounds present  EXTREMITIES:  Moving all four extremities spontaneously, No clubbing, cyanosis, or edema  NERVOUS SYSTEM:  Grossly non focal.  Psychiatry: AAO x name only, followed most simple commands.       OBJECTIVE DATA:       Vital Signs Last 24 Hrs  T(C): 36.7 (01 Aug 2020 10:40), Max: 37.3 (2020 16:49)  T(F): 98 (01 Aug 2020 10:40), Max: 99.2 (2020 16:49)  HR: 80 (01 Aug 2020 10:40) (78 - 92)  BP: 108/69 (01 Aug 2020 10:40) (108/68 - 138/91)  BP(mean): --  RR: 18 (01 Aug 2020 10:40) (16 - 18)  SpO2: 97% (01 Aug 2020 10:40) (92% - 97%)           Daily     Daily Weight in k.2 (01 Aug 2020 05:05)  LABS:                        12.8   4.19  )-----------( 205      ( 01 Aug 2020 06:59 )             40.7             07-31    144  |  109<H>  |  15  ----------------------------<  125<H>  3.8   |  31  |  0.82    Ca    8.2<L>      2020 08:16  Phos  4.9     07-31  Mg     1.8     07-31                         CAPILLARY BLOOD GLUCOSE      POCT Blood Glucose.: 186 mg/dL (01 Aug 2020 11:06)      MEDICATIONS  (STANDING):  aspirin  chewable 162 milliGRAM(s) Oral daily  atorvastatin 40 milliGRAM(s) Oral at bedtime  carvedilol 12.5 milliGRAM(s) Oral every 12 hours  dextrose 5%. 1000 milliLiter(s) (50 mL/Hr) IV Continuous <Continuous>  dextrose 50% Injectable 12.5 Gram(s) IV Push once  dextrose 50% Injectable 25 Gram(s) IV Push once	  dextrose 50% Injectable 25 Gram(s) IV Push once  insulin glargine Injectable (LANTUS) 30 Unit(s) SubCutaneous at bedtime  insulin lispro (HumaLOG) corrective regimen sliding scale   SubCutaneous three times a day before meals  insulin lispro Injectable (HumaLOG) 5 Unit(s) SubCutaneous three times a day before meals  isosorbide   mononitrate ER Tablet (IMDUR) 60 milliGRAM(s) Oral daily  memantine 5 milliGRAM(s) Oral daily  QUEtiapine 200 milliGRAM(s) Oral at bedtime  rivaroxaban 15 milliGRAM(s) Oral two times a day with meals  spironolactone 25 milliGRAM(s) Oral daily    MEDICATIONS  (PRN):  dextrose 40% Gel 15 Gram(s) Oral once PRN Blood Glucose LESS THAN 70 milliGRAM(s)/deciliter  glucagon  Injectable 1 milliGRAM(s) IntraMuscular once PRN Glucose LESS THAN 70 milligrams/deciliter

## 2020-08-02 LAB
GLUCOSE BLDC GLUCOMTR-MCNC: 104 MG/DL — HIGH (ref 70–99)
GLUCOSE BLDC GLUCOMTR-MCNC: 118 MG/DL — HIGH (ref 70–99)
GLUCOSE BLDC GLUCOMTR-MCNC: 125 MG/DL — HIGH (ref 70–99)
GLUCOSE BLDC GLUCOMTR-MCNC: 150 MG/DL — HIGH (ref 70–99)
SARS-COV-2 RNA SPEC QL NAA+PROBE: SIGNIFICANT CHANGE UP

## 2020-08-02 PROCEDURE — 99232 SBSQ HOSP IP/OBS MODERATE 35: CPT

## 2020-08-02 RX ADMIN — QUETIAPINE FUMARATE 200 MILLIGRAM(S): 200 TABLET, FILM COATED ORAL at 22:00

## 2020-08-02 RX ADMIN — RIVAROXABAN 15 MILLIGRAM(S): KIT at 17:56

## 2020-08-02 RX ADMIN — INSULIN GLARGINE 30 UNIT(S): 100 INJECTION, SOLUTION SUBCUTANEOUS at 22:00

## 2020-08-02 RX ADMIN — ATORVASTATIN CALCIUM 40 MILLIGRAM(S): 80 TABLET, FILM COATED ORAL at 22:00

## 2020-08-02 RX ADMIN — ISOSORBIDE MONONITRATE 60 MILLIGRAM(S): 60 TABLET, EXTENDED RELEASE ORAL at 13:17

## 2020-08-02 RX ADMIN — MEMANTINE HYDROCHLORIDE 5 MILLIGRAM(S): 10 TABLET ORAL at 13:17

## 2020-08-02 RX ADMIN — SPIRONOLACTONE 25 MILLIGRAM(S): 25 TABLET, FILM COATED ORAL at 06:40

## 2020-08-02 RX ADMIN — Medication 5 UNIT(S): at 08:03

## 2020-08-02 RX ADMIN — Medication 5 UNIT(S): at 11:49

## 2020-08-02 RX ADMIN — Medication 162 MILLIGRAM(S): at 11:55

## 2020-08-02 RX ADMIN — CARVEDILOL PHOSPHATE 12.5 MILLIGRAM(S): 80 CAPSULE, EXTENDED RELEASE ORAL at 17:58

## 2020-08-02 RX ADMIN — CARVEDILOL PHOSPHATE 12.5 MILLIGRAM(S): 80 CAPSULE, EXTENDED RELEASE ORAL at 06:41

## 2020-08-02 RX ADMIN — Medication 5 UNIT(S): at 16:59

## 2020-08-02 RX ADMIN — RIVAROXABAN 15 MILLIGRAM(S): KIT at 08:49

## 2020-08-02 NOTE — PROGRESS NOTE ADULT - SUBJECTIVE AND OBJECTIVE BOX
Patient is a 75y old  Male who presents with a chief complaint of AMS, Afib (01 Aug 2020 13:24), denies any pain, he refused blood draw.       MEDICATIONS  (STANDING):  aspirin  chewable 162 milliGRAM(s) Oral daily  atorvastatin 40 milliGRAM(s) Oral at bedtime  carvedilol 12.5 milliGRAM(s) Oral every 12 hours  dextrose 5%. 1000 milliLiter(s) (50 mL/Hr) IV Continuous <Continuous>  dextrose 50% Injectable 12.5 Gram(s) IV Push once  dextrose 50% Injectable 25 Gram(s) IV Push once  dextrose 50% Injectable 25 Gram(s) IV Push once  insulin glargine Injectable (LANTUS) 30 Unit(s) SubCutaneous at bedtime  insulin lispro (HumaLOG) corrective regimen sliding scale   SubCutaneous three times a day before meals  insulin lispro Injectable (HumaLOG) 5 Unit(s) SubCutaneous three times a day before meals  isosorbide   mononitrate ER Tablet (IMDUR) 60 milliGRAM(s) Oral daily  memantine 5 milliGRAM(s) Oral daily  QUEtiapine 200 milliGRAM(s) Oral at bedtime  rivaroxaban 15 milliGRAM(s) Oral two times a day with meals  spironolactone 25 milliGRAM(s) Oral daily    MEDICATIONS  (PRN):  dextrose 40% Gel 15 Gram(s) Oral once PRN Blood Glucose LESS THAN 70 milliGRAM(s)/deciliter  glucagon  Injectable 1 milliGRAM(s) IntraMuscular once PRN Glucose LESS THAN 70 milligrams/deciliter        REVIEW OF SYSTEMS:  Limited ROS d/t dementia.     Vital Signs Last 24 Hrs  T(C): 36.8 (02 Aug 2020 06:28), Max: 36.9 (01 Aug 2020 20:15)  T(F): 98.2 (02 Aug 2020 06:28), Max: 98.5 (01 Aug 2020 20:15)  HR: 97 (02 Aug 2020 06:28) (79 - 97)  BP: 115/58 (02 Aug 2020 06:28) (115/58 - 132/75)  BP(mean): --  RR: 18 (02 Aug 2020 06:28) (18 - 18)  SpO2: 94% (02 Aug 2020 06:28) (94% - 100%)    PHYSICAL EXAM:  GENERAL: NAD,  well-developed  HEAD:  Atraumatic, Normocephalic  EYES: conjunctiva and sclera clear  ENMT:  Moist mucous membranes   NECK: Supple, No JVD   NERVOUS SYSTEM:  Alert & Oriented to person, not place and time, he moves all ext.   CHEST/LUNG: CTA bilaterally; No rales, rhonchi, wheezing, or rubs  HEART:  S1,S2; no murmurs, rubs, or gallops  ABDOMEN: Soft, Nontender, Nondistended; Bowel sounds present  EXTREMITIES:  2+ Peripheral Pulses,    LYMPH:  No lymphadenopathy noted  SKIN:  No rashes or lesions    LABS:                        12.8   4.19  )-----------( 205      ( 01 Aug 2020 06:59 )             40.7              cardiac markers     CAPILLARY BLOOD GLUCOSE      POCT Blood Glucose.: 125 mg/dL (02 Aug 2020 10:41)  POCT Blood Glucose.: 118 mg/dL (02 Aug 2020 07:49)  POCT Blood Glucose.: 148 mg/dL (01 Aug 2020 21:27)  POCT Blood Glucose.: 156 mg/dL (01 Aug 2020 16:12)    Cultures    RADIOLOGY & ADDITIONAL TESTS:    Imaging Personally Reviewed:  [ ] YES  [ ] NO    Consultant(s) Notes Reviewed:  [ x ] YES  [ ] NO    Care Discussed with Consultants/Other Providers [ ] YES  [ ] NO

## 2020-08-02 NOTE — PROGRESS NOTE ADULT - SUBJECTIVE AND OBJECTIVE BOX
75F with a suspected PMH of CHF s/p PPM, HTN, DM, dementia who was brought to the ED per AMS.  Patient primarily speaks [place] Creole.  Patient currently AAOx1, unable to provide history.  Per ED attending, patient was noted to not be at his baseline.  Reportedly patient was agitated, confused, walking around his house naked.  Reportedly had COVID19 in April.  When EMS arrived, he was found to be in Afib with .  Given diltiazem in the field.  Patient's apartment reportedly extremely hot and patient found extremely diaphoretic.  Vitals stable in ED.  Labs benign.  Preliminary CT findings - L sided pulmonary embolism and thrombus within the L atrium.  Will admit to tele. (2020 03:36)      Chief Complaint:  Patient is a 75y old  Male who presents with a chief complaint of AMS, Afib (2020 09:51)      Review of Systems:    General:  No wt loss, fevers, chills, night sweats  Eyes:  Good vision, no reported pain  ENT:  No sore throat, pain, runny nose, dysphagia  CV:  No pain, palpitations, hypo/hypertension  Resp:  No dyspnea, cough, tachypnea, wheezing  GI:  No pain, nausea, vomiting, diarrhea, constipation  :  No pain, bleeding, incontinence, nocturia           Social History/Family History  SOCHX:   tobacco,  -  alcohol    FMHX: FA/MO  - contributory       Discussed with:  PMD, Family    Physical Exam:    Vital Signs:  Vital Signs Last 24 Hrs  T(C): 36.7 (2020 16:48), Max: 37.3 (2020 01:23)  T(F): 98.1 (2020 16:48), Max: 99.1 (2020 01:23)  HR: 96 (2020 16:48) (85 - 96)  BP: 115/70 (2020 16:48) (105/48 - 131/70)  BP(mean): --  RR: 18 (2020 16:48) (17 - 22)  SpO2: 99% (2020 16:48) (94% - 99%)  Daily Height in cm: 167.64 (2020 16:48)    Daily   I&O's Summary    2020 07:01  -  2020 07:00  --------------------------------------------------------  IN: 0 mL / OUT: 650 mL / NET: -650 mL  2020 07:01  -  2020 21:36  --------------------------------------------------------  IN: 720 mL / OUT: 0 mL / NET: 720 mL          Chest:  Full & symmetric excursion, no increased effort, breath sounds clear  Cardiovascular:  Regular rhythm, S1, S2, no murmur/rub/S3/S4, no carotid/femoral/abdominal bruit, radial/pedal pulses 2+,   Abdomen:  Soft, non-tender, non-distended, normoactive bowel sounds, no HSM      Laboratory:                          13.4   4.62  )-----------( 181      ( 2020 10:32 )             42.4     07-25    142  |  110<H>  |  15  ----------------------------<  215<H>  4.0   |  26  |  1.15    Ca    8.4<L>      2020 00:10    TPro  6.0  /  Alb  2.4<L>  /  TBili  0.4  /  DBili  x   /  AST  36  /  ALT  33  /  AlkPhos  49  07-25      CARDIAC MARKERS ( 2020 00:10 )  .031 ng/mL / x     / x     / x     / x          CAPILLARY BLOOD GLUCOSE      POCT Blood Glucose.: 186 mg/dL (2020 21:23)  POCT Blood Glucose.: 203 mg/dL (2020 16:45)  POCT Blood Glucose.: 74 mg/dL (2020 11:10)  POCT Blood Glucose.: 107 mg/dL (2020 08:00)  POCT Blood Glucose.: 129 mg/dL (2020 05:58)    LIVER FUNCTIONS - ( 2020 00:10 )  Alb: 2.4 g/dL / Pro: 6.0 gm/dL / ALK PHOS: 49 U/L / ALT: 33 U/L / AST: 36 U/L / GGT: x           PT/INR - ( 2020 00:10 )   PT: 14.3 sec;   INR: 1.30 ratio         PTT - ( 2020 17:39 )  PTT:50.4 sec  Urinalysis Basic - ( 2020 04:10 )    Color: Yellow / Appearance: Clear / S.010 / pH: x  Gluc: x / Ketone: Negative  / Bili: Negative / Urobili: Negative mg/dL   Blood: x / Protein: 15 mg/dL / Nitrite: Negative   Leuk Esterase: Negative / RBC: 3-5 /HPF / WBC 0-2   Sq Epi: x / Non Sq Epi: Occasional / Bacteria: Negative      Assessment:  the patient's primary diagnosis was altered mental status  Upon review of the chart due to the patient not giving an adequate history through an  they appear confused  the CAT scan angiogram reveals a pulmonary embolus   Treatment for both of these issues remains the same with heparin intravenous continuing, to AC PO  A diagnostic echocardiogram is completed, no clot noted  Continue AC, no AGUSTÍN needed  F/U out patient echo in 4 weeks  Cardiac enzymes remain normal, BNP level is low  EKG is noted And vital signs remained stable on current regimen  Stable for DC  Rehab in am

## 2020-08-03 LAB
GLUCOSE BLDC GLUCOMTR-MCNC: 110 MG/DL — HIGH (ref 70–99)
GLUCOSE BLDC GLUCOMTR-MCNC: 116 MG/DL — HIGH (ref 70–99)
GLUCOSE BLDC GLUCOMTR-MCNC: 126 MG/DL — HIGH (ref 70–99)
GLUCOSE BLDC GLUCOMTR-MCNC: 150 MG/DL — HIGH (ref 70–99)
HCT VFR BLD CALC: 43 % — SIGNIFICANT CHANGE UP (ref 39–50)
HGB BLD-MCNC: 13.3 G/DL — SIGNIFICANT CHANGE UP (ref 13–17)
MCHC RBC-ENTMCNC: 27.8 PG — SIGNIFICANT CHANGE UP (ref 27–34)
MCHC RBC-ENTMCNC: 30.9 GM/DL — LOW (ref 32–36)
MCV RBC AUTO: 90 FL — SIGNIFICANT CHANGE UP (ref 80–100)
NRBC # BLD: 0 /100 WBCS — SIGNIFICANT CHANGE UP (ref 0–0)
PLATELET # BLD AUTO: 224 K/UL — SIGNIFICANT CHANGE UP (ref 150–400)
RBC # BLD: 4.78 M/UL — SIGNIFICANT CHANGE UP (ref 4.2–5.8)
RBC # FLD: 16.1 % — HIGH (ref 10.3–14.5)
WBC # BLD: 4.01 K/UL — SIGNIFICANT CHANGE UP (ref 3.8–10.5)
WBC # FLD AUTO: 4.01 K/UL — SIGNIFICANT CHANGE UP (ref 3.8–10.5)

## 2020-08-03 PROCEDURE — 99239 HOSP IP/OBS DSCHRG MGMT >30: CPT

## 2020-08-03 RX ORDER — ASPIRIN/CALCIUM CARB/MAGNESIUM 324 MG
2 TABLET ORAL
Qty: 60 | Refills: 0
Start: 2020-08-03 | End: 2020-09-01

## 2020-08-03 RX ORDER — RIVAROXABAN 15 MG-20MG
1 KIT ORAL
Qty: 12 | Refills: 0
Start: 2020-08-03 | End: 2020-08-08

## 2020-08-03 RX ORDER — MEMANTINE HYDROCHLORIDE 10 MG/1
1 TABLET ORAL
Qty: 30 | Refills: 0
Start: 2020-08-03 | End: 2020-09-01

## 2020-08-03 RX ORDER — ISOSORBIDE MONONITRATE 60 MG/1
1 TABLET, EXTENDED RELEASE ORAL
Qty: 30 | Refills: 0
Start: 2020-08-03 | End: 2020-09-01

## 2020-08-03 RX ORDER — QUETIAPINE FUMARATE 200 MG/1
1 TABLET, FILM COATED ORAL
Qty: 30 | Refills: 0
Start: 2020-08-03 | End: 2020-09-01

## 2020-08-03 RX ORDER — SPIRONOLACTONE 25 MG/1
1 TABLET, FILM COATED ORAL
Qty: 30 | Refills: 0
Start: 2020-08-03 | End: 2020-09-01

## 2020-08-03 RX ORDER — CARVEDILOL PHOSPHATE 80 MG/1
1 CAPSULE, EXTENDED RELEASE ORAL
Qty: 60 | Refills: 0
Start: 2020-08-03 | End: 2020-09-01

## 2020-08-03 RX ADMIN — CARVEDILOL PHOSPHATE 12.5 MILLIGRAM(S): 80 CAPSULE, EXTENDED RELEASE ORAL at 17:00

## 2020-08-03 RX ADMIN — Medication 162 MILLIGRAM(S): at 11:13

## 2020-08-03 RX ADMIN — INSULIN GLARGINE 30 UNIT(S): 100 INJECTION, SOLUTION SUBCUTANEOUS at 22:43

## 2020-08-03 RX ADMIN — RIVAROXABAN 15 MILLIGRAM(S): KIT at 17:00

## 2020-08-03 RX ADMIN — ATORVASTATIN CALCIUM 40 MILLIGRAM(S): 80 TABLET, FILM COATED ORAL at 22:43

## 2020-08-03 RX ADMIN — RIVAROXABAN 15 MILLIGRAM(S): KIT at 07:59

## 2020-08-03 RX ADMIN — CARVEDILOL PHOSPHATE 12.5 MILLIGRAM(S): 80 CAPSULE, EXTENDED RELEASE ORAL at 05:37

## 2020-08-03 RX ADMIN — QUETIAPINE FUMARATE 200 MILLIGRAM(S): 200 TABLET, FILM COATED ORAL at 22:43

## 2020-08-03 RX ADMIN — Medication 5 UNIT(S): at 16:03

## 2020-08-03 RX ADMIN — Medication 5 UNIT(S): at 07:59

## 2020-08-03 RX ADMIN — SPIRONOLACTONE 25 MILLIGRAM(S): 25 TABLET, FILM COATED ORAL at 05:37

## 2020-08-03 RX ADMIN — MEMANTINE HYDROCHLORIDE 5 MILLIGRAM(S): 10 TABLET ORAL at 11:13

## 2020-08-03 RX ADMIN — Medication 5 UNIT(S): at 11:14

## 2020-08-03 RX ADMIN — ISOSORBIDE MONONITRATE 60 MILLIGRAM(S): 60 TABLET, EXTENDED RELEASE ORAL at 11:13

## 2020-08-03 NOTE — DISCHARGE NOTE NURSING/CASE MANAGEMENT/SOCIAL WORK - PATIENT PORTAL LINK FT
You can access the FollowMyHealth Patient Portal offered by [facility] by registering at the following website: [email or web address]. By joining [facility]’s FollowMyHealth portal, you will also be able to view your health information using other applications (apps) compatible with our system.

## 2020-08-03 NOTE — DISCHARGE NOTE PROVIDER - NSDCCPCAREPLAN_GEN_ALL_CORE_FT
PRINCIPAL DISCHARGE DIAGNOSIS  Diagnosis: Pulmonary embolus  Assessment and Plan of Treatment:       SECONDARY DISCHARGE DIAGNOSES  Diagnosis: Acute metabolic encephalopathy  Assessment and Plan of Treatment:     Diagnosis: Abnormal EKG  Assessment and Plan of Treatment:

## 2020-08-03 NOTE — DISCHARGE NOTE PROVIDER - HOSPITAL COURSE
75F with a suspected PMH of CHF s/p PPM, HTN, DM, dementia who was brought to the ED per AMS.  Patient primarily speaks [place] Creole.  Patient currently AAOx1, unable to provide history.  Per ED attending, patient was noted to not be at his baseline.  Reportedly patient was agitated, confused, walking around his house naked.  Reportedly had COVID19 in April.  When EMS arrived, he was found to be in Afib with .  Given diltiazem in the field.  Patient's apartment reportedly extremely hot and patient found extremely diaphoretic.  Vitals stable in ED.  Labs benign.  Preliminary CT findings - L sided pulmonary embolism and thrombus within the L atrium.  He was admitted for pulmonary embolism, treated with xarelto, he is stable and will be discharged to [facility].        A/P    Acute Metabolic encephalopathy - unspecified etiology, with h/o dementia    blood cultures: 7/25/2020 - negative.    UA relatively unremarkable     supportive care        Acute pulmonary embolism, unspecified pulmonary embolism type, unspecified whether acute cor pulmonale present. + suspected atrial thrombus.     -reviewed ECHO and it showed severe Pulm HTN and moderate TR.     -cont xarelto. watch for any active bleeding.     -slow ventricular response was likely from AVN blocker. discussed with cardiologist. outpatient follow up in couple of weeks.     -repeat AGUSTÍN in 4 weeks as outpatient.          Atrial fibrillation, unspecified type.      -cont current meds.           Cerebrovascular accident (CVA), unspecified mechanism.  Plan: age indeterminant infarct in left BG    -unclear given presence of Mitral valve repair and sternotomy,  can obtain MRI .     -nevertheless checked lipid panel  noted continue with statin    -continue preventive measures and treatment for cva      -Reviewed repeat CT head. no Acute process.          Chronic systolic congestive heart failure.      - spironolactone, imdur at home on 120 mg po daily      - plavix 75.         Dementia without behavioral disturbance, unspecified dementia type. cont current meds.         Essential hypertension.      - carvedilol.          Type 2 diabetes mellitus without complication, without long-term current use of insulin. controlled.     -cont current management. Follow finger sticks.        Family can't take care of the patient.     -patient is accepted at Rehab placement tomorrow, 8/3/2020..          It took 35 minutes to discharge the patient.

## 2020-08-03 NOTE — PROGRESS NOTE ADULT - SUBJECTIVE AND OBJECTIVE BOX
75F with a suspected PMH of CHF s/p PPM, HTN, DM, dementia who was brought to the ED per AMS.  Patient primarily speaks [place] Creole.  Patient currently AAOx1, unable to provide history.  Per ED attending, patient was noted to not be at his baseline.  Reportedly patient was agitated, confused, walking around his house naked.  Reportedly had COVID19 in April.  When EMS arrived, he was found to be in Afib with .  Given diltiazem in the field.  Patient's apartment reportedly extremely hot and patient found extremely diaphoretic.  Vitals stable in ED.  Labs benign.  Preliminary CT findings - L sided pulmonary embolism and thrombus within the L atrium.  Will admit to tele. (2020 03:36)      Chief Complaint:  Patient is a 75y old  Male who presents with a chief complaint of AMS, Afib (2020 09:51)      Review of Systems:    General:  No wt loss, fevers, chills, night sweats  Eyes:  Good vision, no reported pain  ENT:  No sore throat, pain, runny nose, dysphagia  CV:  No pain, palpitations, hypo/hypertension  Resp:  No dyspnea, cough, tachypnea, wheezing  GI:  No pain, nausea, vomiting, diarrhea, constipation  :  No pain, bleeding, incontinence, nocturia           Social History/Family History  SOCHX:   tobacco,  -  alcohol    FMHX: FA/MO  - contributory       Discussed with:  PMD, Family    Physical Exam:    Vital Signs:  Vital Signs Last 24 Hrs  T(C): 36.7 (2020 16:48), Max: 37.3 (2020 01:23)  T(F): 98.1 (2020 16:48), Max: 99.1 (2020 01:23)  HR: 96 (2020 16:48) (85 - 96)  BP: 115/70 (2020 16:48) (105/48 - 131/70)  BP(mean): --  RR: 18 (2020 16:48) (17 - 22)  SpO2: 99% (2020 16:48) (94% - 99%)  Daily Height in cm: 167.64 (2020 16:48)    Daily   I&O's Summary    2020 07:01  -  2020 07:00  --------------------------------------------------------  IN: 0 mL / OUT: 650 mL / NET: -650 mL  2020 07:01  -  2020 21:36  --------------------------------------------------------  IN: 720 mL / OUT: 0 mL / NET: 720 mL          Chest:  Full & symmetric excursion, no increased effort, breath sounds clear  Cardiovascular:  Regular rhythm, S1, S2, no murmur/rub/S3/S4, no carotid/femoral/abdominal bruit, radial/pedal pulses 2+,   Abdomen:  Soft, non-tender, non-distended, normoactive bowel sounds, no HSM      Laboratory:                          13.4   4.62  )-----------( 181      ( 2020 10:32 )             42.4     07-25    142  |  110<H>  |  15  ----------------------------<  215<H>  4.0   |  26  |  1.15    Ca    8.4<L>      2020 00:10    TPro  6.0  /  Alb  2.4<L>  /  TBili  0.4  /  DBili  x   /  AST  36  /  ALT  33  /  AlkPhos  49  07-25      CARDIAC MARKERS ( 2020 00:10 )  .031 ng/mL / x     / x     / x     / x          CAPILLARY BLOOD GLUCOSE      POCT Blood Glucose.: 186 mg/dL (2020 21:23)  POCT Blood Glucose.: 203 mg/dL (2020 16:45)  POCT Blood Glucose.: 74 mg/dL (2020 11:10)  POCT Blood Glucose.: 107 mg/dL (2020 08:00)  POCT Blood Glucose.: 129 mg/dL (2020 05:58)    LIVER FUNCTIONS - ( 2020 00:10 )  Alb: 2.4 g/dL / Pro: 6.0 gm/dL / ALK PHOS: 49 U/L / ALT: 33 U/L / AST: 36 U/L / GGT: x           PT/INR - ( 2020 00:10 )   PT: 14.3 sec;   INR: 1.30 ratio         PTT - ( 2020 17:39 )  PTT:50.4 sec  Urinalysis Basic - ( 2020 04:10 )    Color: Yellow / Appearance: Clear / S.010 / pH: x  Gluc: x / Ketone: Negative  / Bili: Negative / Urobili: Negative mg/dL   Blood: x / Protein: 15 mg/dL / Nitrite: Negative   Leuk Esterase: Negative / RBC: 3-5 /HPF / WBC 0-2   Sq Epi: x / Non Sq Epi: Occasional / Bacteria: Negative      Assessment:  the patient's primary diagnosis was altered mental status  Upon review of the chart due to the patient not giving an adequate history through an  they appear confused  the CAT scan angiogram reveals a pulmonary embolus   Treatment for both of these issues remains the same with heparin intravenous continuing, to AC PO  A diagnostic echocardiogram is completed, no clot noted  Continue AC, no AGUSTÍN needed  F/U out patient echo in 4 weeks  Cardiac enzymes remain normal, BNP level is low  EKG is noted And vital signs remained stable on current regimen  Stable for DC  Rehab

## 2020-08-03 NOTE — CHART NOTE - NSCHARTNOTEFT_GEN_A_CORE
Assessment:  Pt is [place] Creole speaking & also speaks English well.  Pt is alert, stated birthday correctly, appeared forgetful, unable to provide detailed diet history.    Pt adm c PE, AMS, abnormal EKG, c acute metabolic encephalopathy, Afib, CVA, chronic CHF, dementia, HTN., DM( no diabetic meds PTA noted), family can't take care of the pt, plan is for Rehab.  RD provided simple diet education for meal planning c plate method, Low sodium diet & elderly blood glucose goal of A1C%<8.0.       Factors impacting intake: [ x] none [ ] nausea  [ ] vomiting [ ] diarrhea [ ] constipation  [ ]chewing problems [ ] swallowing issues  [ ] other:     Diet Prescription: Diet, Consistent Carbohydrate w/Evening Snack:   DASH/TLC {Sodium & Cholesterol Restricted} (07-27-20 @ 15:33)    Intake: >75%    Current Weight: 08/01/20, 110.8 kg, 07/25/20, 115 kg, c t. loss of 5.2 kg  % Weight Change: 4.5%  08/03/20, 1+ edema of feet & generalized edema noted, wt. loss most likely fluid related.     Pertinent Medications: MEDICATIONS  (STANDING):  aspirin  chewable 162 milliGRAM(s) Oral daily  atorvastatin 40 milliGRAM(s) Oral at bedtime  carvedilol 12.5 milliGRAM(s) Oral every 12 hours  dextrose 5%. 1000 milliLiter(s) (50 mL/Hr) IV Continuous <Continuous>  dextrose 50% Injectable 12.5 Gram(s) IV Push once  dextrose 50% Injectable 25 Gram(s) IV Push once  dextrose 50% Injectable 25 Gram(s) IV Push once  insulin glargine Injectable (LANTUS) 30 Unit(s) SubCutaneous at bedtime  insulin lispro (HumaLOG) corrective regimen sliding scale   SubCutaneous three times a day before meals  insulin lispro Injectable (HumaLOG) 5 Unit(s) SubCutaneous three times a day before meals  isosorbide   mononitrate ER Tablet (IMDUR) 60 milliGRAM(s) Oral daily  memantine 5 milliGRAM(s) Oral daily  QUEtiapine 200 milliGRAM(s) Oral at bedtime  rivaroxaban 15 milliGRAM(s) Oral two times a day with meals  spironolactone 25 milliGRAM(s) Oral daily    MEDICATIONS  (PRN):  dextrose 40% Gel 15 Gram(s) Oral once PRN Blood Glucose LESS THAN 70 milliGRAM(s)/deciliter  glucagon  Injectable 1 milliGRAM(s) IntraMuscular once PRN Glucose LESS THAN 70 milligrams/deciliter    Pertinent Labs:  07-31 Phos 4.9 mg/dL<H> 07-26 Chol 212 mg/dL<H>  mg/dL<H> HDL 30 mg/dL<L> Trig 120 mg/dL  07-26-20 @ 11:01 a1c 12.3<H>   CAPILLARY BLOOD GLUCOSE      POCT Blood Glucose.: 150 mg/dL (03 Aug 2020 11:12)  POCT Blood Glucose.: 126 mg/dL (03 Aug 2020 07:56)  POCT Blood Glucose.: 150 mg/dL (02 Aug 2020 20:43)  POCT Blood Glucose.: 104 mg/dL (02 Aug 2020 15:39)    Skin: WDL    Estimated Needs:   [ x] no change since previous assessment (07/27/20)  [ ] recalculated:     Previous Nutrition Diagnosis:     Other Excessive carbohydrate intake.     Etiology physiological causes requiring modified carbohydrate intake (uncontrolled T2DM), food/nutrition-related knowledge deficit.     Signs/Symptoms A1c 12.3, avg 306mg/dL, unable to verbalize understanding of carbohydrate consistent diet.     Goal/Expected Outcome Pt to adhere to carbohydrate consistent diet during LOS; met     Nutrition Diagnosis is [ ] ongoing  [ x] resolved; POCT blood Glucose range in good control @ present, pt eating what is provided to him  [ ] not applicable       New Nutrition Diagnosis: [x ] not applicable       Interventions:   Recommend  [x] Continue c current diet regimen   [ ] Change Diet To:  [ ] Nutrition Supplement  [ ] Nutrition Support  [x ] Other: Endocrine consult post discharge as indicated     Monitoring and Evaluation:   [ x] PO intake [ x ] Tolerance to diet prescription [ x ] weights [ x ] labs; glucose, A1C%  [ x ] follow up per protocol  [ ] other:

## 2020-08-03 NOTE — DISCHARGE NOTE PROVIDER - NSDCMRMEDTOKEN_GEN_ALL_CORE_FT
aspirin 81 mg oral tablet, chewable: 2 tab(s) orally once a day  carvedilol 12.5 mg oral tablet: 1 tab(s) orally every 12 hours  isosorbide mononitrate 60 mg oral tablet, extended release: 1 tab(s) orally once a day  memantine 5 mg oral tablet: 1 tab(s) orally once a day  QUEtiapine 200 mg oral tablet: 1 tab(s) orally once a day (at bedtime)  rivaroxaban 15 mg oral tablet: 1 tab(s) orally 2 times a day (with meals) for 6 days until 8/9/2020 followed by Xarelto 20mg oral daily.   spironolactone 25 mg oral tablet: 1 tab(s) orally once a day  Xarelto 20 mg oral tablet: 1 tab(s) orally once a day

## 2020-08-04 VITALS
RESPIRATION RATE: 16 BRPM | SYSTOLIC BLOOD PRESSURE: 100 MMHG | HEART RATE: 76 BPM | DIASTOLIC BLOOD PRESSURE: 62 MMHG | OXYGEN SATURATION: 97 %

## 2020-08-04 LAB
GLUCOSE BLDC GLUCOMTR-MCNC: 139 MG/DL — HIGH (ref 70–99)
HCT VFR BLD CALC: 44.9 % — SIGNIFICANT CHANGE UP (ref 39–50)
HGB BLD-MCNC: 13.9 G/DL — SIGNIFICANT CHANGE UP (ref 13–17)
MCHC RBC-ENTMCNC: 27.7 PG — SIGNIFICANT CHANGE UP (ref 27–34)
MCHC RBC-ENTMCNC: 31 GM/DL — LOW (ref 32–36)
MCV RBC AUTO: 89.6 FL — SIGNIFICANT CHANGE UP (ref 80–100)
NRBC # BLD: 0 /100 WBCS — SIGNIFICANT CHANGE UP (ref 0–0)
PLATELET # BLD AUTO: 237 K/UL — SIGNIFICANT CHANGE UP (ref 150–400)
RBC # BLD: 5.01 M/UL — SIGNIFICANT CHANGE UP (ref 4.2–5.8)
RBC # FLD: 15.9 % — HIGH (ref 10.3–14.5)
WBC # BLD: 5.05 K/UL — SIGNIFICANT CHANGE UP (ref 3.8–10.5)
WBC # FLD AUTO: 5.05 K/UL — SIGNIFICANT CHANGE UP (ref 3.8–10.5)

## 2020-08-04 PROCEDURE — 99232 SBSQ HOSP IP/OBS MODERATE 35: CPT

## 2020-08-04 RX ADMIN — SPIRONOLACTONE 25 MILLIGRAM(S): 25 TABLET, FILM COATED ORAL at 06:20

## 2020-08-04 RX ADMIN — CARVEDILOL PHOSPHATE 12.5 MILLIGRAM(S): 80 CAPSULE, EXTENDED RELEASE ORAL at 06:20

## 2020-08-04 RX ADMIN — Medication 5 UNIT(S): at 11:12

## 2020-08-04 RX ADMIN — Medication 162 MILLIGRAM(S): at 11:12

## 2020-08-04 RX ADMIN — RIVAROXABAN 15 MILLIGRAM(S): KIT at 17:08

## 2020-08-04 RX ADMIN — MEMANTINE HYDROCHLORIDE 5 MILLIGRAM(S): 10 TABLET ORAL at 11:12

## 2020-08-04 RX ADMIN — ISOSORBIDE MONONITRATE 60 MILLIGRAM(S): 60 TABLET, EXTENDED RELEASE ORAL at 11:11

## 2020-08-04 RX ADMIN — RIVAROXABAN 15 MILLIGRAM(S): KIT at 09:19

## 2020-08-04 NOTE — PROGRESS NOTE ADULT - SUBJECTIVE AND OBJECTIVE BOX
Patient is a 75y old  Male who presents with a chief complaint of AMS, Afib (03 Aug 2020 15:55), denies any pain.    MEDICATIONS  (STANDING):  aspirin  chewable 162 milliGRAM(s) Oral daily  atorvastatin 40 milliGRAM(s) Oral at bedtime  carvedilol 12.5 milliGRAM(s) Oral every 12 hours  dextrose 5%. 1000 milliLiter(s) (50 mL/Hr) IV Continuous <Continuous>  dextrose 50% Injectable 12.5 Gram(s) IV Push once  dextrose 50% Injectable 25 Gram(s) IV Push once  dextrose 50% Injectable 25 Gram(s) IV Push once  insulin glargine Injectable (LANTUS) 30 Unit(s) SubCutaneous at bedtime  insulin lispro (HumaLOG) corrective regimen sliding scale   SubCutaneous three times a day before meals  insulin lispro Injectable (HumaLOG) 5 Unit(s) SubCutaneous three times a day before meals  isosorbide   mononitrate ER Tablet (IMDUR) 60 milliGRAM(s) Oral daily  memantine 5 milliGRAM(s) Oral daily  QUEtiapine 200 milliGRAM(s) Oral at bedtime  rivaroxaban 15 milliGRAM(s) Oral two times a day with meals  spironolactone 25 milliGRAM(s) Oral daily    MEDICATIONS  (PRN):  dextrose 40% Gel 15 Gram(s) Oral once PRN Blood Glucose LESS THAN 70 milliGRAM(s)/deciliter  glucagon  Injectable 1 milliGRAM(s) IntraMuscular once PRN Glucose LESS THAN 70 milligrams/deciliter      REVIEW OF SYSTEMS: 12 systems reviewed and negative.      Vital Signs Last 24 Hrs  T(C): 36.9 (04 Aug 2020 11:15), Max: 36.9 (04 Aug 2020 11:15)  T(F): 98.5 (04 Aug 2020 11:15), Max: 98.5 (04 Aug 2020 11:15)  HR: 76 (04 Aug 2020 12:10) (76 - 93)  BP: 112/76 (04 Aug 2020 11:15) (111/56 - 129/83)  BP(mean): --  RR: 19 (04 Aug 2020 12:10) (16 - 19)  SpO2: 96% (04 Aug 2020 12:10) (96% - 97%)    PHYSICAL EXAM:  GENERAL: NAD, well-groomed, well-developed  HEAD:  Atraumatic, Normocephalic  EYES: EOMI, PERRLA, conjunctiva and sclera clear  ENMT: No tonsillar erythema, exudates, or enlargement; Moist mucous membranes   NECK: Supple, No JVD   NERVOUS SYSTEM:  Alert & Oriented X 1, to person  CHEST/LUNG: Clear to auscultation  bilaterally; No rales, rhonchi, wheezing, or rubs  HEART: Regular rate and rhythm; No murmurs, rubs, or gallops  ABDOMEN: Soft, Nontender, Nondistended; Bowel sounds present  EXTREMITIES:  2+ Peripheral Pulses, No clubbing, cyanosis, or edema  LYMPH: No lymphadenopathy noted  SKIN: No rashes or lesions    LABS:                        13.9   5.05  )-----------( 237      ( 04 Aug 2020 09:37 )             44.9              cardiac markers     CAPILLARY BLOOD GLUCOSE      POCT Blood Glucose.: 139 mg/dL (04 Aug 2020 10:57)  POCT Blood Glucose.: 110 mg/dL (03 Aug 2020 21:28)  POCT Blood Glucose.: 116 mg/dL (03 Aug 2020 15:41)    Cultures    RADIOLOGY & ADDITIONAL TESTS:    Imaging Personally Reviewed:  [ ] YES  [ ] NO    Consultant(s) Notes Reviewed:  [x ] YES  [ ] NO    Care Discussed with Consultants/Other Providers [ ] YES  [ ] NO

## 2020-08-04 NOTE — PROGRESS NOTE ADULT - ASSESSMENT
Patient is a 75F with a suspected PMH of CHF s/p PPM, HTN, DM, dementia who was brought to the ED per AMS.  Patient primarily speaks Wallisian Creole.  Patient currently AAOx1, unable to provide history.  Per ED attending, patient was noted to not be at his baseline.  Reportedly patient was agitated, confused, walking around his house naked.  Reportedly had COVID19 in April.  When EMS arrived, he was found to be in Afib with .  Given diltiazem in the field.  Patient's apartment reportedly extremely hot and patient found extremely diaphoretic.  Vitals stable in ED.  Labs benign.  Preliminary CT findings - L sided pulmonary embolism and thrombus within the L atrium.  Will admit to tele.      Problem/Plan - 1:  ·  Problem: Acute Metabolic encephalopathy. unspecified etiology     blood cultures,  prelim ngtd    UA relatively unremarkable   Has hx of dementia, reportedly current mental status worse than baseline   cont current meds. no clinical evidence of infection.     Problem/Plan - 2:  ·  Problem: Acute pulmonary embolism, unspecified pulmonary embolism type, unspecified whether acute cor pulmonale present. + suspected atrial thrombus.   reviewed ECHO and it showed severe Pulm HTN and moderate TR.   cont xarelto. watch for any active bleeding.   slow ventricular response was likely from AVN blocker. discussed with cardiologist. outpatient follow up in couple of weeks. no need for AGUSTÍN.     Problem/Plan - 3:  ·  Problem: Atrial fibrillation, unspecified type.  cont current meds. watch for any active bleeding.       Problem/Plan - 4:  ·  Problem: Cerebrovascular accident (CVA), unspecified mechanism.  Plan: age indeterminant infarct in left BG   unclear given presence of Mitral valve repair and sternotomy,  can obtain MRI .    nevertheless checked lipid panel  noted continue with statin   continue preventive measures and treatment for cva    get pt/ot.   Follow repeat CT head to evaluate for any evolutionary development.     Problem/Plan - 5:  ·  Problem: Chronic systolic congestive heart failure.  Plan: spironolactone, imdur at home on 120 mg po dauily     and plavix 75.     Problem/Plan - 6:  Problem: Dementia without behavioral disturbance, unspecified dementia type. Plan: Seroquel qhs, memantine qd.    Problem/Plan - 7:  ·  Problem: Essential hypertension.  Plan: carvedilol.     Problem/Plan - 8:  ·  Problem: Type 2 diabetes mellitus without complication, without long-term current use of insulin.  uncontrolled. Added premeal coverage. follow finger sticks.
Patient is a 75F with a suspected PMH of CHF s/p PPM, HTN, DM, dementia who was brought to the ED per AMS.  Patient primarily speaks Albanian Creole.  Patient currently AAOx1, unable to provide history.  Per ED attending, patient was noted to not be at his baseline.  Reportedly patient was agitated, confused, walking around his house naked.  Reportedly had COVID19 in April.  When EMS arrived, he was found to be in Afib with .  Given diltiazem in the field.  Patient's apartment reportedly extremely hot and patient found extremely diaphoretic.  Vitals stable in ED.  Labs benign.  Preliminary CT findings - L sided pulmonary embolism and thrombus within the L atrium.  Will admit to tele.
Patient is a 75F with a suspected PMH of CHF s/p PPM, HTN, DM, dementia who was brought to the ED per AMS.  Patient primarily speaks Bermudian Creole.  Patient currently AAOx1, unable to provide history.  Per ED attending, patient was noted to not be at his baseline.  Reportedly patient was agitated, confused, walking around his house naked.  Reportedly had COVID19 in April.  When EMS arrived, he was found to be in Afib with .  Given diltiazem in the field.  Patient's apartment reportedly extremely hot and patient found extremely diaphoretic.  Vitals stable in ED.  Labs benign.  Preliminary CT findings - L sided pulmonary embolism and thrombus within the L atrium.  Will admit to tele.      Problem/Plan - 1:  ·  Problem: Acute Metabolic encephalopathy. unspecified etiology     blood cultures,  prelim ngtd    UA relatively unremarkable   Has hx of dementia, reportedly current mental status worse than baseline   cont current meds. no clinical evidence of infection.     Problem/Plan - 2:  ·  Problem: Acute pulmonary embolism, unspecified pulmonary embolism type, unspecified whether acute cor pulmonale present. + suspected atrial thrombus.   reviewed ECHO and it showed severe Pulm HTN and moderate TR.   cont xarelto. watch for any active bleeding.   slow ventricular response was likely from AVN blocker. discussed with cardiologist. outpatient follow up in couple of weeks. repeat AGUSTÍN in 4 weeks as outpatient.      Problem/Plan - 3:  ·  Problem: Atrial fibrillation, unspecified type.  cont current meds. watch for any active bleeding.       Problem/Plan - 4:  ·  Problem: Cerebrovascular accident (CVA), unspecified mechanism.  Plan: age indeterminant infarct in left BG   unclear given presence of Mitral valve repair and sternotomy,  can obtain MRI .    nevertheless checked lipid panel  noted continue with statin   continue preventive measures and treatment for cva    get pt/ot.   Reviewed repeat CT head. no Acute process.      Problem/Plan - 5:  ·  Problem: Chronic systolic congestive heart failure.  Plan: spironolactone, imdur at home on 120 mg po dauily     and plavix 75.     Problem/Plan - 6:  Problem: Dementia without behavioral disturbance, unspecified dementia type. Plan: Seroquel qhs, memantine qd.    Problem/Plan - 7:  ·  Problem: Essential hypertension.  Plan: carvedilol.     Problem/Plan - 8:  ·  Problem: Type 2 diabetes mellitus without complication, without long-term current use of insulin.  Better controlled. cont current management. Follow finger sticks.     Pending placement. care manager following.
Patient is a 75F with a suspected PMH of CHF s/p PPM, HTN, DM, dementia who was brought to the ED per AMS.  Patient primarily speaks British Creole.  Patient currently AAOx1, unable to provide history.  Per ED attending, patient was noted to not be at his baseline.  Reportedly patient was agitated, confused, walking around his house naked.  Reportedly had COVID19 in April.  When EMS arrived, he was found to be in Afib with .  Given diltiazem in the field.  Patient's apartment reportedly extremely hot and patient found extremely diaphoretic.  Vitals stable in ED.  Labs benign.  Preliminary CT findings - L sided pulmonary embolism and thrombus within the L atrium.  Will admit to tele.
Patient is a 75F with a suspected PMH of CHF s/p PPM, HTN, DM, dementia who was brought to the ED per AMS.  Patient primarily speaks Guamanian Creole.  Patient currently AAOx1, unable to provide history.  Per ED attending, patient was noted to not be at his baseline.  Reportedly patient was agitated, confused, walking around his house naked.  Reportedly had COVID19 in April.  When EMS arrived, he was found to be in Afib with .  Given diltiazem in the field.  Patient's apartment reportedly extremely hot and patient found extremely diaphoretic.  Vitals stable in ED.  Labs benign.  Preliminary CT findings - L sided pulmonary embolism and thrombus within the L atrium.  Will admit to tele.      Problem/Plan - 1:  ·  Problem: Acute Metabolic encephalopathy.    blood cultures,  prelim ngtd    UA relatively unremarkable   Has hx of dementia, reportedly current mental status worse than baseline   cont current meds. no clinical evidence of infection.     Problem/Plan - 2:  ·  Problem: Acute pulmonary embolism, unspecified pulmonary embolism type, unspecified whether acute cor pulmonale present. + suspected atrial thrombus.   reviewed ECHO and it showed severe Pulm HTN and moderate TR.   cont xarelto. watch for any active bleeding.   cont tele.   slow ventricular response was likely from AVN blocker. discussed with cardiologist >>> follow recs.     Problem/Plan - 3:  ·  Problem: Atrial fibrillation, unspecified type.  cont current meds. watch for any active bleeding.       Problem/Plan - 4:  ·  Problem: Cerebrovascular accident (CVA), unspecified mechanism.  Plan: age indeterminant infarct in left BG   unclear given presence of Mitral valve repair and sternotomy,  can obtain MRI .    nevertheless checked lipid panel  noted continue with statin   continue preventive measures and treatment for cva    get pt/ot.     Problem/Plan - 5:  ·  Problem: Chronic systolic congestive heart failure.  Plan: spironolactone, imdur at home on 120 mg po dauily     and plavix 75.     Problem/Plan - 6:  Problem: Dementia without behavioral disturbance, unspecified dementia type. Plan: Seroquel qhs, memantine qd.    Problem/Plan - 7:  ·  Problem: Essential hypertension.  Plan: carvedilol.     Problem/Plan - 8:  ·  Problem: Type 2 diabetes mellitus without complication, without long-term current use of insulin.  Plan: insulin corrective scale   hgba1c 12.8  controlled. cont current regimen. Follow finger sticks.
Patient is a 75F with a suspected PMH of CHF s/p PPM, HTN, DM, dementia who was brought to the ED per AMS.  Patient primarily speaks Irish Creole.  Patient currently AAOx1, unable to provide history.  Per ED attending, patient was noted to not be at his baseline.  Reportedly patient was agitated, confused, walking around his house naked.  Reportedly had COVID19 in April.  When EMS arrived, he was found to be in Afib with .  Given diltiazem in the field.  Patient's apartment reportedly extremely hot and patient found extremely diaphoretic.  Vitals stable in ED.  Labs benign.  Preliminary CT findings - L sided pulmonary embolism and thrombus within the L atrium.  Will admit to tele.
Patient is a 75F with a suspected PMH of CHF s/p PPM, HTN, DM, dementia who was brought to the ED per AMS.  Patient primarily speaks Kuwaiti Creole.  Patient currently AAOx1, unable to provide history.  Per ED attending, patient was noted to not be at his baseline.  Reportedly patient was agitated, confused, walking around his house naked.  Reportedly had COVID19 in April.  When EMS arrived, he was found to be in Afib with .  Given diltiazem in the field.  Patient's apartment reportedly extremely hot and patient found extremely diaphoretic.  Vitals stable in ED.  Labs benign.  Preliminary CT findings - L sided pulmonary embolism and thrombus within the L atrium.  Will admit to tele.      A/P  Acute Metabolic encephalopathy - unspecified etiology, with h/o dementia  blood cultures: 7/25/2020 - negative.  UA relatively unremarkable   supportive care      Acute pulmonary embolism, unspecified pulmonary embolism type, unspecified whether acute cor pulmonale present. + suspected atrial thrombus.   -reviewed ECHO and it showed severe Pulm HTN and moderate TR.   -cont xarelto. watch for any active bleeding.   -slow ventricular response was likely from AVN blocker. discussed with cardiologist. outpatient follow up in couple of weeks.   -repeat AGUSTÍN in 4 weeks as outpatient.        Atrial fibrillation, unspecified type.    -cont current meds.         Cerebrovascular accident (CVA), unspecified mechanism.  Plan: age indeterminant infarct in left BG  -unclear given presence of Mitral valve repair and sternotomy,  can obtain MRI .   -nevertheless checked lipid panel  noted continue with statin  -continue preventive measures and treatment for cva    -Reviewed repeat CT head. no Acute process.        Chronic systolic congestive heart failure.    - spironolactone, imdur at home on 120 mg po daily    - plavix 75.       Dementia without behavioral disturbance, unspecified dementia type. cont current meds.       Essential hypertension.    - carvedilol.        Type 2 diabetes mellitus without complication, without long-term current use of insulin. controlled.   - cont current management.     Family can't take care of the patient.     Awaiting rehab placement.
Patient is a 75F with a suspected PMH of CHF s/p PPM, HTN, DM, dementia who was brought to the ED per AMS.  Patient primarily speaks Macanese Creole.  Patient currently AAOx1, unable to provide history.  Per ED attending, patient was noted to not be at his baseline.  Reportedly patient was agitated, confused, walking around his house naked.  Reportedly had COVID19 in April.  When EMS arrived, he was found to be in Afib with .  Given diltiazem in the field.  Patient's apartment reportedly extremely hot and patient found extremely diaphoretic.  Vitals stable in ED.  Labs benign.  Preliminary CT findings - L sided pulmonary embolism and thrombus within the L atrium.  Will admit to tele.        Acute Metabolic encephalopathy - unspecified etiology, with h/o dementia  blood cultures: 7/25/2020 - negative.  UA relatively unremarkable   supportive care      Acute pulmonary embolism, unspecified pulmonary embolism type, unspecified whether acute cor pulmonale present. + suspected atrial thrombus.   -reviewed ECHO and it showed severe Pulm HTN and moderate TR.   -cont xarelto. watch for any active bleeding.   -slow ventricular response was likely from AVN blocker. discussed with cardiologist. outpatient follow up in couple of weeks.   -repeat AGUSTÍN in 4 weeks as outpatient.        Atrial fibrillation, unspecified type.    -cont current meds.         Cerebrovascular accident (CVA), unspecified mechanism.  Plan: age indeterminant infarct in left BG  -unclear given presence of Mitral valve repair and sternotomy,  can obtain MRI .   -nevertheless checked lipid panel  noted continue with statin  -continue preventive measures and treatment for cva    -Reviewed repeat CT head. no Acute process.        Chronic systolic congestive heart failure.    - spironolactone, imdur at home on 120 mg po daily    - plavix 75.       Dementia without behavioral disturbance, unspecified dementia type. cont current meds.       Essential hypertension.    - carvedilol.        Type 2 diabetes mellitus without complication, without long-term current use of insulin. controlled.   -cont current management. Follow finger sticks.    Family can't take care of the patient.   -patient is accepted at Rehab placement tomorrow, 8/3/2020..
Patient is a 75F with a suspected PMH of CHF s/p PPM, HTN, DM, dementia who was brought to the ED per AMS.  Patient primarily speaks Sammarinese Creole.  Patient currently AAOx1, unable to provide history.  Per ED attending, patient was noted to not be at his baseline.  Reportedly patient was agitated, confused, walking around his house naked.  Reportedly had COVID19 in April.  When EMS arrived, he was found to be in Afib with .  Given diltiazem in the field.  Patient's apartment reportedly extremely hot and patient found extremely diaphoretic.  Vitals stable in ED.  Labs benign.  Preliminary CT findings - L sided pulmonary embolism and thrombus within the L atrium.  Will admit to tele.      Problem/Plan - 1:  ·  Problem: Acute Metabolic encephalopathy. unspecified etiology     blood cultures,  prelim ngtd    UA relatively unremarkable   Has hx of dementia,    cont current meds. no clinical evidence of infection.     Problem/Plan - 2:  ·  Problem: Acute pulmonary embolism, unspecified pulmonary embolism type, unspecified whether acute cor pulmonale present. + suspected atrial thrombus.   reviewed ECHO and it showed severe Pulm HTN and moderate TR.   cont xarelto. watch for any active bleeding.   slow ventricular response was likely from AVN blocker. discussed with cardiologist. outpatient follow up in couple of weeks. repeat AGUSTÍN in 4 weeks as outpatient.      Problem/Plan - 3:  ·  Problem: Atrial fibrillation, unspecified type.  cont current meds. watch for any active bleeding.       Problem/Plan - 4:  ·  Problem: Cerebrovascular accident (CVA), unspecified mechanism.  Plan: age indeterminant infarct in left BG   unclear given presence of Mitral valve repair and sternotomy,  can obtain MRI .    nevertheless checked lipid panel  noted continue with statin   continue preventive measures and treatment for cva    Reviewed repeat CT head. no Acute process.      Problem/Plan - 5:  ·  Problem: Chronic systolic congestive heart failure.  Plan: spironolactone, imdur at home on 120 mg po dauily     and plavix 75.     Problem/Plan - 6:  Problem: Dementia without behavioral disturbance, unspecified dementia type. cont current meds.     Problem/Plan - 7:  ·  Problem: Essential hypertension.  Plan: carvedilol.     Problem/Plan - 8:  ·  Problem: Type 2 diabetes mellitus without complication, without long-term current use of insulin. controlled. cont current management. Follow finger sticks.    Family cant take care of the patient. patient is accepted at [facility] on Monday.
Patient is a 75F with a suspected PMH of CHF s/p PPM, HTN, DM, dementia who was brought to the ED per AMS.  Patient primarily speaks Tajik Creole.  Patient currently AAOx1, unable to provide history.  Per ED attending, patient was noted to not be at his baseline.  Reportedly patient was agitated, confused, walking around his house naked.  Reportedly had COVID19 in April.  When EMS arrived, he was found to be in Afib with .  Given diltiazem in the field.  Patient's apartment reportedly extremely hot and patient found extremely diaphoretic.  Vitals stable in ED.  Labs benign.  Preliminary CT findings - L sided pulmonary embolism and thrombus within the L atrium.  Will admit to tele.

## 2020-08-04 NOTE — PROGRESS NOTE ADULT - PROVIDER SPECIALTY LIST ADULT
Cardiology
Hospitalist

## 2020-08-04 NOTE — PROGRESS NOTE ADULT - SUBJECTIVE AND OBJECTIVE BOX
75F with a suspected PMH of CHF s/p PPM, HTN, DM, dementia who was brought to the ED per AMS.  Patient primarily speaks Indonesian Creole.  Patient currently AAOx1, unable to provide history.  Per ED attending, patient was noted to not be at his baseline.  Reportedly patient was agitated, confused, walking around his house naked.  Reportedly had COVID19 in April.  When EMS arrived, he was found to be in Afib with .  Given diltiazem in the field.  Patient's apartment reportedly extremely hot and patient found extremely diaphoretic.  Vitals stable in ED.  Labs benign.  Preliminary CT findings - L sided pulmonary embolism and thrombus within the L atrium.  Will admit to tele. (2020 03:36)      Chief Complaint:  Patient is a 75y old  Male who presents with a chief complaint of AMS, Afib (2020 09:51)      Review of Systems:    General:  No wt loss, fevers, chills, night sweats  Eyes:  Good vision, no reported pain  ENT:  No sore throat, pain, runny nose, dysphagia  CV:  No pain, palpitations, hypo/hypertension  Resp:  No dyspnea, cough, tachypnea, wheezing  GI:  No pain, nausea, vomiting, diarrhea, constipation  :  No pain, bleeding, incontinence, nocturia           Social History/Family History  SOCHX:   tobacco,  -  alcohol    FMHX: FA/MO  - contributory       Discussed with:  PMD, Family    Physical Exam:    Vital Signs:  Vital Signs Last 24 Hrs  T(C): 36.7 (2020 16:48), Max: 37.3 (2020 01:23)  T(F): 98.1 (2020 16:48), Max: 99.1 (2020 01:23)  HR: 96 (2020 16:48) (85 - 96)  BP: 115/70 (2020 16:48) (105/48 - 131/70)  BP(mean): --  RR: 18 (2020 16:48) (17 - 22)  SpO2: 99% (2020 16:48) (94% - 99%)  Daily Height in cm: 167.64 (2020 16:48)    Daily   I&O's Summary    2020 07:01  -  2020 07:00  --------------------------------------------------------  IN: 0 mL / OUT: 650 mL / NET: -650 mL  2020 07:01  -  2020 21:36  --------------------------------------------------------  IN: 720 mL / OUT: 0 mL / NET: 720 mL          Chest:  Full & symmetric excursion, no increased effort, breath sounds clear  Cardiovascular:  Regular rhythm, S1, S2, no murmur/rub/S3/S4, no carotid/femoral/abdominal bruit, radial/pedal pulses 2+,   Abdomen:  Soft, non-tender, non-distended, normoactive bowel sounds, no HSM      Laboratory:                          13.4   4.62  )-----------( 181      ( 2020 10:32 )             42.4     07-25    142  |  110<H>  |  15  ----------------------------<  215<H>  4.0   |  26  |  1.15    Ca    8.4<L>      2020 00:10    TPro  6.0  /  Alb  2.4<L>  /  TBili  0.4  /  DBili  x   /  AST  36  /  ALT  33  /  AlkPhos  49  07-25      CARDIAC MARKERS ( 2020 00:10 )  .031 ng/mL / x     / x     / x     / x          CAPILLARY BLOOD GLUCOSE      POCT Blood Glucose.: 186 mg/dL (2020 21:23)  POCT Blood Glucose.: 203 mg/dL (2020 16:45)  POCT Blood Glucose.: 74 mg/dL (2020 11:10)  POCT Blood Glucose.: 107 mg/dL (2020 08:00)  POCT Blood Glucose.: 129 mg/dL (2020 05:58)    LIVER FUNCTIONS - ( 2020 00:10 )  Alb: 2.4 g/dL / Pro: 6.0 gm/dL / ALK PHOS: 49 U/L / ALT: 33 U/L / AST: 36 U/L / GGT: x           PT/INR - ( 2020 00:10 )   PT: 14.3 sec;   INR: 1.30 ratio         PTT - ( 2020 17:39 )  PTT:50.4 sec  Urinalysis Basic - ( 2020 04:10 )    Color: Yellow / Appearance: Clear / S.010 / pH: x  Gluc: x / Ketone: Negative  / Bili: Negative / Urobili: Negative mg/dL   Blood: x / Protein: 15 mg/dL / Nitrite: Negative   Leuk Esterase: Negative / RBC: 3-5 /HPF / WBC 0-2   Sq Epi: x / Non Sq Epi: Occasional / Bacteria: Negative      Assessment:  the patient's primary diagnosis was altered mental status  Upon review of the chart due to the patient not giving an adequate history through an  they appear confused  the CAT scan angiogram reveals a pulmonary embolus   Treatment for both of these issues remains the same with heparin intravenous continuing, to AC PO  A diagnostic echocardiogram is completed, no clot noted  Continue AC, no AGUSTÍN needed  F/U out patient echo in 4 weeks  Cardiac enzymes remain normal, BNP level is low  EKG is noted And vital signs remained stable on current regimen  Stable for DC  Rehab

## 2020-08-04 NOTE — PROGRESS NOTE ADULT - REASON FOR ADMISSION
AMS, Afib

## 2020-08-10 DIAGNOSIS — Z79.82 LONG TERM (CURRENT) USE OF ASPIRIN: ICD-10-CM

## 2020-08-10 DIAGNOSIS — Z86.19 PERSONAL HISTORY OF OTHER INFECTIOUS AND PARASITIC DISEASES: ICD-10-CM

## 2020-08-10 DIAGNOSIS — I11.0 HYPERTENSIVE HEART DISEASE WITH HEART FAILURE: ICD-10-CM

## 2020-08-10 DIAGNOSIS — I08.3 COMBINED RHEUMATIC DISORDERS OF MITRAL, AORTIC AND TRICUSPID VALVES: ICD-10-CM

## 2020-08-10 DIAGNOSIS — I48.91 UNSPECIFIED ATRIAL FIBRILLATION: ICD-10-CM

## 2020-08-10 DIAGNOSIS — Z79.4 LONG TERM (CURRENT) USE OF INSULIN: ICD-10-CM

## 2020-08-10 DIAGNOSIS — I27.20 PULMONARY HYPERTENSION, UNSPECIFIED: ICD-10-CM

## 2020-08-10 DIAGNOSIS — I26.99 OTHER PULMONARY EMBOLISM WITHOUT ACUTE COR PULMONALE: ICD-10-CM

## 2020-08-10 DIAGNOSIS — E66.01 MORBID (SEVERE) OBESITY DUE TO EXCESS CALORIES: ICD-10-CM

## 2020-08-10 DIAGNOSIS — G93.41 METABOLIC ENCEPHALOPATHY: ICD-10-CM

## 2020-08-10 DIAGNOSIS — Z95.0 PRESENCE OF CARDIAC PACEMAKER: ICD-10-CM

## 2020-08-10 DIAGNOSIS — I50.22 CHRONIC SYSTOLIC (CONGESTIVE) HEART FAILURE: ICD-10-CM

## 2020-08-10 DIAGNOSIS — F03.90 UNSPECIFIED DEMENTIA WITHOUT BEHAVIORAL DISTURBANCE: ICD-10-CM

## 2020-08-10 DIAGNOSIS — Z86.73 PERSONAL HISTORY OF TRANSIENT ISCHEMIC ATTACK (TIA), AND CEREBRAL INFARCTION WITHOUT RESIDUAL DEFICITS: ICD-10-CM

## 2020-08-10 RX ORDER — RIVAROXABAN 15 MG-20MG
1 KIT ORAL
Qty: 90 | Refills: 0
Start: 2020-08-10 | End: 2020-11-07

## 2021-12-16 NOTE — ED PROVIDER NOTE - NEUROLOGICAL, MLM
Alert and oriented, no focal deficits, no gross motor or sensory deficits. patient noncompliant with detailed neuro exam no

## 2025-05-06 NOTE — ED PROVIDER NOTE - CLINICAL SUMMARY MEDICAL DECISION MAKING FREE TEXT BOX
. patient was in rapid afib w/ RVR at HR ~170, receied 25 IV diltiazem from EMS, HR since afib in .   EKG with anterior TWI and in afib. no chest pain. troponin wnl. patient with labored breathing and mild tachypnea but normal o2 saturation.  Will w/u for infectious, metabolic cuases of AMS. D-dimer elevated. Will obtain CT angio. will not anticoagulate until CT head obtained.